# Patient Record
Sex: FEMALE | Race: OTHER | NOT HISPANIC OR LATINO | Employment: OTHER | RURAL
[De-identification: names, ages, dates, MRNs, and addresses within clinical notes are randomized per-mention and may not be internally consistent; named-entity substitution may affect disease eponyms.]

---

## 2021-04-21 RX ORDER — DOXAZOSIN 1 MG/1
1 TABLET ORAL NIGHTLY
COMMUNITY

## 2021-04-21 RX ORDER — ASPIRIN 325 MG
100 TABLET, DELAYED RELEASE (ENTERIC COATED) ORAL DAILY
COMMUNITY

## 2021-04-21 RX ORDER — CHLORTHALIDONE 50 MG/1
50 TABLET ORAL DAILY
COMMUNITY

## 2021-04-21 RX ORDER — INSULIN GLARGINE 100 [IU]/ML
30 INJECTION, SOLUTION SUBCUTANEOUS DAILY
COMMUNITY
End: 2021-04-22 | Stop reason: SDUPTHER

## 2021-04-21 RX ORDER — METOPROLOL SUCCINATE 25 MG/1
25 TABLET, EXTENDED RELEASE ORAL DAILY
COMMUNITY
End: 2022-04-07 | Stop reason: SDUPTHER

## 2021-04-21 RX ORDER — LOSARTAN POTASSIUM 100 MG/1
100 TABLET ORAL DAILY
COMMUNITY
End: 2022-11-14

## 2021-04-21 RX ORDER — GLIMEPIRIDE 4 MG/1
4 TABLET ORAL
COMMUNITY
End: 2021-04-22

## 2021-04-21 RX ORDER — DILTIAZEM HYDROCHLORIDE 240 MG/1
240 CAPSULE, COATED, EXTENDED RELEASE ORAL DAILY
COMMUNITY
End: 2024-01-12 | Stop reason: DRUGHIGH

## 2021-04-22 ENCOUNTER — OFFICE VISIT (OUTPATIENT)
Dept: DIABETES SERVICES | Facility: CLINIC | Age: 71
End: 2021-04-22
Payer: MEDICARE

## 2021-04-22 VITALS
DIASTOLIC BLOOD PRESSURE: 78 MMHG | WEIGHT: 204.63 LBS | OXYGEN SATURATION: 98 % | RESPIRATION RATE: 16 BRPM | BODY MASS INDEX: 30.31 KG/M2 | HEIGHT: 69 IN | SYSTOLIC BLOOD PRESSURE: 158 MMHG | HEART RATE: 65 BPM

## 2021-04-22 DIAGNOSIS — Z79.4 TYPE 2 DIABETES MELLITUS WITH HYPERGLYCEMIA, WITH LONG-TERM CURRENT USE OF INSULIN: Primary | ICD-10-CM

## 2021-04-22 DIAGNOSIS — E11.69 TYPE 2 DIABETES MELLITUS WITH OTHER SPECIFIED COMPLICATION, WITHOUT LONG-TERM CURRENT USE OF INSULIN: ICD-10-CM

## 2021-04-22 DIAGNOSIS — E78.5 HYPERLIPIDEMIA, UNSPECIFIED HYPERLIPIDEMIA TYPE: ICD-10-CM

## 2021-04-22 DIAGNOSIS — I10 HYPERTENSION, UNSPECIFIED TYPE: ICD-10-CM

## 2021-04-22 DIAGNOSIS — E11.65 TYPE 2 DIABETES MELLITUS WITH HYPERGLYCEMIA, WITH LONG-TERM CURRENT USE OF INSULIN: Primary | ICD-10-CM

## 2021-04-22 LAB
GLUCOSE SERPL-MCNC: 177 MG/DL (ref 70–110)
HBA1C MFR BLD: 7.5 %

## 2021-04-22 PROCEDURE — 99214 PR OFFICE/OUTPT VISIT, EST, LEVL IV, 30-39 MIN: ICD-10-PCS | Mod: S$PBB,,, | Performed by: NURSE PRACTITIONER

## 2021-04-22 PROCEDURE — 82962 GLUCOSE BLOOD TEST: CPT | Mod: PBBFAC | Performed by: NURSE PRACTITIONER

## 2021-04-22 PROCEDURE — 99999 PR PBB SHADOW E&M-EST. PATIENT-LVL V: CPT | Mod: PBBFAC,,, | Performed by: NURSE PRACTITIONER

## 2021-04-22 PROCEDURE — 99214 OFFICE O/P EST MOD 30 MIN: CPT | Mod: S$PBB,,, | Performed by: NURSE PRACTITIONER

## 2021-04-22 PROCEDURE — 99215 OFFICE O/P EST HI 40 MIN: CPT | Mod: PBBFAC | Performed by: NURSE PRACTITIONER

## 2021-04-22 PROCEDURE — 83036 HEMOGLOBIN GLYCOSYLATED A1C: CPT | Mod: PBBFAC | Performed by: NURSE PRACTITIONER

## 2021-04-22 PROCEDURE — 99999 PR PBB SHADOW E&M-EST. PATIENT-LVL V: ICD-10-PCS | Mod: PBBFAC,,, | Performed by: NURSE PRACTITIONER

## 2021-04-22 RX ORDER — INSULIN GLARGINE 100 [IU]/ML
30 INJECTION, SOLUTION SUBCUTANEOUS DAILY
Qty: 45 ML | Refills: 1 | Status: SHIPPED | OUTPATIENT
Start: 2021-04-22 | End: 2021-10-28

## 2021-04-22 RX ORDER — SULFAMETHOXAZOLE AND TRIMETHOPRIM 800; 160 MG/1; MG/1
1 TABLET ORAL 2 TIMES DAILY
COMMUNITY

## 2021-04-22 RX ORDER — DARUNAVIR ETHANOLATE AND COBICISTAT 800; 150 MG/1; MG/1
1 TABLET, FILM COATED ORAL DAILY
COMMUNITY
End: 2023-08-01 | Stop reason: SDUPTHER

## 2021-04-22 RX ORDER — GLIPIZIDE 10 MG/1
10 TABLET, FILM COATED, EXTENDED RELEASE ORAL
Qty: 90 TABLET | Refills: 1 | Status: SHIPPED | OUTPATIENT
Start: 2021-04-22 | End: 2021-10-05 | Stop reason: SDUPTHER

## 2021-07-22 ENCOUNTER — OFFICE VISIT (OUTPATIENT)
Dept: DIABETES SERVICES | Facility: CLINIC | Age: 71
End: 2021-07-22
Payer: MEDICARE

## 2021-07-22 VITALS
WEIGHT: 198 LBS | DIASTOLIC BLOOD PRESSURE: 90 MMHG | BODY MASS INDEX: 29.33 KG/M2 | SYSTOLIC BLOOD PRESSURE: 130 MMHG | HEART RATE: 82 BPM | OXYGEN SATURATION: 99 % | HEIGHT: 69 IN | RESPIRATION RATE: 16 BRPM

## 2021-07-22 DIAGNOSIS — I10 ESSENTIAL HYPERTENSION: ICD-10-CM

## 2021-07-22 DIAGNOSIS — Z21 ASYMPTOMATIC HIV INFECTION: ICD-10-CM

## 2021-07-22 DIAGNOSIS — Z79.4 TYPE 2 DIABETES MELLITUS WITHOUT COMPLICATION, WITH LONG-TERM CURRENT USE OF INSULIN: Primary | ICD-10-CM

## 2021-07-22 DIAGNOSIS — R01.1 MURMUR: ICD-10-CM

## 2021-07-22 DIAGNOSIS — E11.9 TYPE 2 DIABETES MELLITUS WITHOUT COMPLICATION, WITH LONG-TERM CURRENT USE OF INSULIN: Primary | ICD-10-CM

## 2021-07-22 DIAGNOSIS — E78.5 HYPERLIPIDEMIA, UNSPECIFIED HYPERLIPIDEMIA TYPE: ICD-10-CM

## 2021-07-22 LAB
GLUCOSE SERPL-MCNC: 148 MG/DL (ref 70–110)
HBA1C MFR BLD: 7.4 % (ref 4.5–6.6)

## 2021-07-22 PROCEDURE — 99213 OFFICE O/P EST LOW 20 MIN: CPT | Mod: S$PBB,,, | Performed by: NURSE PRACTITIONER

## 2021-07-22 PROCEDURE — 99215 OFFICE O/P EST HI 40 MIN: CPT | Mod: PBBFAC | Performed by: NURSE PRACTITIONER

## 2021-07-22 PROCEDURE — 83036 HEMOGLOBIN GLYCOSYLATED A1C: CPT | Mod: PBBFAC | Performed by: NURSE PRACTITIONER

## 2021-07-22 PROCEDURE — 82962 GLUCOSE BLOOD TEST: CPT | Mod: PBBFAC | Performed by: NURSE PRACTITIONER

## 2021-07-22 PROCEDURE — 99213 PR OFFICE/OUTPT VISIT, EST, LEVL III, 20-29 MIN: ICD-10-PCS | Mod: S$PBB,,, | Performed by: NURSE PRACTITIONER

## 2021-10-05 ENCOUNTER — OFFICE VISIT (OUTPATIENT)
Dept: CARDIOLOGY | Facility: CLINIC | Age: 71
End: 2021-10-05
Payer: MEDICARE

## 2021-10-05 VITALS
DIASTOLIC BLOOD PRESSURE: 72 MMHG | HEART RATE: 62 BPM | WEIGHT: 197.75 LBS | RESPIRATION RATE: 14 BRPM | SYSTOLIC BLOOD PRESSURE: 140 MMHG | BODY MASS INDEX: 29.29 KG/M2 | HEIGHT: 69 IN

## 2021-10-05 DIAGNOSIS — E78.5 HYPERLIPIDEMIA, UNSPECIFIED HYPERLIPIDEMIA TYPE: ICD-10-CM

## 2021-10-05 DIAGNOSIS — I48.0 PAROXYSMAL ATRIAL FIBRILLATION: Primary | ICD-10-CM

## 2021-10-05 DIAGNOSIS — I10 HYPERTENSION, UNSPECIFIED TYPE: ICD-10-CM

## 2021-10-05 DIAGNOSIS — Z79.899 ENCOUNTER FOR LONG-TERM (CURRENT) USE OF OTHER MEDICATIONS: ICD-10-CM

## 2021-10-05 DIAGNOSIS — E11.9 TYPE 2 DIABETES MELLITUS WITHOUT COMPLICATION, WITHOUT LONG-TERM CURRENT USE OF INSULIN: Primary | ICD-10-CM

## 2021-10-05 DIAGNOSIS — B20 HIV INFECTION, UNSPECIFIED SYMPTOM STATUS: ICD-10-CM

## 2021-10-05 DIAGNOSIS — I27.20 PULMONARY HYPERTENSION: ICD-10-CM

## 2021-10-05 PROCEDURE — 93010 ELECTROCARDIOGRAM REPORT: CPT | Mod: S$PBB,,, | Performed by: INTERNAL MEDICINE

## 2021-10-05 PROCEDURE — 93005 ELECTROCARDIOGRAM TRACING: CPT | Mod: PBBFAC | Performed by: INTERNAL MEDICINE

## 2021-10-05 PROCEDURE — 93010 EKG 12-LEAD: ICD-10-PCS | Mod: S$PBB,,, | Performed by: INTERNAL MEDICINE

## 2021-10-05 PROCEDURE — 99214 PR OFFICE/OUTPT VISIT, EST, LEVL IV, 30-39 MIN: ICD-10-PCS | Mod: S$PBB,,, | Performed by: INTERNAL MEDICINE

## 2021-10-05 PROCEDURE — 99214 OFFICE O/P EST MOD 30 MIN: CPT | Mod: PBBFAC | Performed by: INTERNAL MEDICINE

## 2021-10-05 PROCEDURE — 99214 OFFICE O/P EST MOD 30 MIN: CPT | Mod: S$PBB,,, | Performed by: INTERNAL MEDICINE

## 2021-10-05 RX ORDER — GLIPIZIDE 10 MG/1
10 TABLET, FILM COATED, EXTENDED RELEASE ORAL
Qty: 90 TABLET | Refills: 1 | Status: SHIPPED | OUTPATIENT
Start: 2021-10-05 | End: 2022-02-22 | Stop reason: SDUPTHER

## 2021-10-28 ENCOUNTER — OFFICE VISIT (OUTPATIENT)
Dept: DIABETES SERVICES | Facility: CLINIC | Age: 71
End: 2021-10-28
Payer: MEDICARE

## 2021-10-28 VITALS
DIASTOLIC BLOOD PRESSURE: 72 MMHG | SYSTOLIC BLOOD PRESSURE: 138 MMHG | HEART RATE: 68 BPM | RESPIRATION RATE: 16 BRPM | WEIGHT: 203 LBS | OXYGEN SATURATION: 99 % | HEIGHT: 69 IN | BODY MASS INDEX: 30.07 KG/M2

## 2021-10-28 DIAGNOSIS — E11.42 DIABETIC POLYNEUROPATHY ASSOCIATED WITH TYPE 2 DIABETES MELLITUS: ICD-10-CM

## 2021-10-28 DIAGNOSIS — I27.20 PULMONARY HYPERTENSION: ICD-10-CM

## 2021-10-28 DIAGNOSIS — B20 HIV INFECTION, UNSPECIFIED SYMPTOM STATUS: ICD-10-CM

## 2021-10-28 DIAGNOSIS — E11.9 TYPE 2 DIABETES MELLITUS WITHOUT COMPLICATION, WITH LONG-TERM CURRENT USE OF INSULIN: Primary | ICD-10-CM

## 2021-10-28 DIAGNOSIS — E78.5 HYPERLIPIDEMIA, UNSPECIFIED HYPERLIPIDEMIA TYPE: ICD-10-CM

## 2021-10-28 DIAGNOSIS — I10 PRIMARY HYPERTENSION: ICD-10-CM

## 2021-10-28 DIAGNOSIS — Z79.4 TYPE 2 DIABETES MELLITUS WITHOUT COMPLICATION, WITH LONG-TERM CURRENT USE OF INSULIN: Primary | ICD-10-CM

## 2021-10-28 LAB
GLUCOSE SERPL-MCNC: 98 MG/DL (ref 70–110)
HBA1C MFR BLD: 7.7 % (ref 4.5–6.6)

## 2021-10-28 PROCEDURE — 99215 OFFICE O/P EST HI 40 MIN: CPT | Mod: PBBFAC | Performed by: NURSE PRACTITIONER

## 2021-10-28 PROCEDURE — 99214 PR OFFICE/OUTPT VISIT, EST, LEVL IV, 30-39 MIN: ICD-10-PCS | Mod: S$PBB,,, | Performed by: NURSE PRACTITIONER

## 2021-10-28 PROCEDURE — 99214 OFFICE O/P EST MOD 30 MIN: CPT | Mod: S$PBB,,, | Performed by: NURSE PRACTITIONER

## 2021-10-28 PROCEDURE — 82962 GLUCOSE BLOOD TEST: CPT | Mod: PBBFAC | Performed by: NURSE PRACTITIONER

## 2021-10-28 PROCEDURE — 83036 HEMOGLOBIN GLYCOSYLATED A1C: CPT | Mod: PBBFAC | Performed by: NURSE PRACTITIONER

## 2021-10-28 RX ORDER — INSULIN GLARGINE 100 [IU]/ML
34 INJECTION, SOLUTION SUBCUTANEOUS DAILY
Qty: 45 ML | Refills: 1 | Status: SHIPPED | OUTPATIENT
Start: 2021-10-28 | End: 2022-08-08 | Stop reason: SDUPTHER

## 2021-11-03 ENCOUNTER — TELEPHONE (OUTPATIENT)
Dept: DIABETES SERVICES | Facility: CLINIC | Age: 71
End: 2021-11-03
Payer: MEDICARE

## 2022-02-22 DIAGNOSIS — E11.9 TYPE 2 DIABETES MELLITUS WITHOUT COMPLICATION, WITHOUT LONG-TERM CURRENT USE OF INSULIN: ICD-10-CM

## 2022-02-22 RX ORDER — GLIPIZIDE 10 MG/1
10 TABLET, FILM COATED, EXTENDED RELEASE ORAL
Qty: 90 TABLET | Refills: 1 | Status: SHIPPED | OUTPATIENT
Start: 2022-02-22 | End: 2022-09-01 | Stop reason: SDUPTHER

## 2022-02-22 RX ORDER — ISOPROPYL ALCOHOL 70 ML/100ML
SWAB TOPICAL
Qty: 100 EACH | Refills: 1 | Status: SHIPPED | OUTPATIENT
Start: 2022-02-22 | End: 2022-02-24 | Stop reason: SDUPTHER

## 2022-02-22 RX ORDER — ISOPROPYL ALCOHOL 70 ML/100ML
SWAB TOPICAL
COMMUNITY
Start: 2021-12-22 | End: 2022-02-22 | Stop reason: SDUPTHER

## 2022-02-24 RX ORDER — ISOPROPYL ALCOHOL 70 ML/100ML
SWAB TOPICAL
Qty: 400 EACH | Refills: 1 | Status: SHIPPED | OUTPATIENT
Start: 2022-02-24 | End: 2022-12-01 | Stop reason: SDUPTHER

## 2022-02-24 NOTE — TELEPHONE ENCOUNTER
----- Message from Purvi Herrera sent at 2/24/2022  2:42 PM CST -----  Regarding: Alcohol Wipes  [facility] needs a new script sent in for alcohol wipes. Should be qty 800 not 100. They need it resent in

## 2022-03-01 NOTE — TELEPHONE ENCOUNTER
----- Message from Purvi Herrera sent at 2/28/2022  4:47 PM CST -----  Patient request refill on test strips sent to [facility]

## 2022-04-07 ENCOUNTER — OFFICE VISIT (OUTPATIENT)
Dept: CARDIOLOGY | Facility: CLINIC | Age: 72
End: 2022-04-07
Payer: MEDICARE

## 2022-04-07 VITALS
SYSTOLIC BLOOD PRESSURE: 150 MMHG | BODY MASS INDEX: 28.71 KG/M2 | HEIGHT: 69 IN | OXYGEN SATURATION: 98 % | WEIGHT: 193.81 LBS | HEART RATE: 76 BPM | DIASTOLIC BLOOD PRESSURE: 90 MMHG

## 2022-04-07 DIAGNOSIS — E78.5 HYPERLIPIDEMIA, UNSPECIFIED HYPERLIPIDEMIA TYPE: Chronic | ICD-10-CM

## 2022-04-07 DIAGNOSIS — I48.0 PAROXYSMAL ATRIAL FIBRILLATION: Primary | Chronic | ICD-10-CM

## 2022-04-07 DIAGNOSIS — I10 PRIMARY HYPERTENSION: Chronic | ICD-10-CM

## 2022-04-07 DIAGNOSIS — I48.92 ATRIAL FLUTTER, UNSPECIFIED TYPE: Chronic | ICD-10-CM

## 2022-04-07 DIAGNOSIS — B20 HIV INFECTION, UNSPECIFIED SYMPTOM STATUS: Chronic | ICD-10-CM

## 2022-04-07 DIAGNOSIS — N18.9 CHRONIC KIDNEY DISEASE, UNSPECIFIED CKD STAGE: Chronic | ICD-10-CM

## 2022-04-07 PROCEDURE — 93010 ELECTROCARDIOGRAM REPORT: CPT | Mod: S$PBB,,, | Performed by: INTERNAL MEDICINE

## 2022-04-07 PROCEDURE — 93005 ELECTROCARDIOGRAM TRACING: CPT | Mod: PBBFAC | Performed by: INTERNAL MEDICINE

## 2022-04-07 PROCEDURE — 93010 EKG 12-LEAD: ICD-10-PCS | Mod: S$PBB,,, | Performed by: INTERNAL MEDICINE

## 2022-04-07 PROCEDURE — 99214 OFFICE O/P EST MOD 30 MIN: CPT | Mod: PBBFAC | Performed by: INTERNAL MEDICINE

## 2022-04-07 PROCEDURE — 99214 PR OFFICE/OUTPT VISIT, EST, LEVL IV, 30-39 MIN: ICD-10-PCS | Mod: S$PBB,,, | Performed by: INTERNAL MEDICINE

## 2022-04-07 PROCEDURE — 99214 OFFICE O/P EST MOD 30 MIN: CPT | Mod: S$PBB,,, | Performed by: INTERNAL MEDICINE

## 2022-04-07 RX ORDER — METOPROLOL SUCCINATE 25 MG/1
25 TABLET, EXTENDED RELEASE ORAL DAILY
Qty: 90 TABLET | Refills: 1 | Status: SHIPPED | OUTPATIENT
Start: 2022-04-07

## 2022-04-07 RX ORDER — ISOSORBIDE MONONITRATE 30 MG/1
30 TABLET, EXTENDED RELEASE ORAL DAILY
Qty: 90 TABLET | Refills: 3 | Status: SHIPPED | OUTPATIENT
Start: 2022-04-07 | End: 2023-04-06

## 2022-04-07 RX ORDER — HYDRALAZINE HYDROCHLORIDE 25 MG/1
37.5 TABLET, FILM COATED ORAL 3 TIMES DAILY
Qty: 405 TABLET | Refills: 3 | Status: SHIPPED | OUTPATIENT
Start: 2022-04-07 | End: 2022-11-14

## 2022-04-07 RX ORDER — OLMESARTAN MEDOXOMIL 40 MG/1
40 TABLET ORAL DAILY
COMMUNITY
Start: 2022-03-30 | End: 2023-11-30

## 2022-04-14 PROBLEM — I48.92 ATRIAL FLUTTER: Chronic | Status: ACTIVE | Noted: 2022-04-14

## 2022-04-14 PROBLEM — N18.9 CHRONIC KIDNEY DISEASE: Chronic | Status: ACTIVE | Noted: 2022-04-14

## 2022-04-14 NOTE — PROGRESS NOTES
[facility] note        DATE OF SERVICE: 04/14/2022       PCP: Primary Doctor No      CHIEF COMPLAINT:   Chief Complaint   Patient presents with    Follow-up     Patient denies any cardiac complaints today.        HISTORY OF PRESENT ILLNESS:  Hallie Pena is a 72 y.o. female with a PMH of   Past Medical History:   Diagnosis Date    Atrial fibrillation     Atrial flutter     CKD (chronic kidney disease)     Corns and callosities     Diabetic neuropathy     HIV (human immunodeficiency virus infection)     Hyperlipemia     Hypertension     Murmur     Pulmonary hypertension     Renal impairment     Type 2 diabetes mellitus      who presents for   Chief Complaint   Patient presents with    Follow-up     Patient denies any cardiac complaints today.          Review of Systems: Review of Systems   Respiratory: Negative for shortness of breath.    Cardiovascular: Negative for chest pain, palpitations and leg swelling.   Neurological: Negative for loss of consciousness.        PAST MEDICAL HISTORY:  Past Medical History:   Diagnosis Date    Atrial fibrillation     Atrial flutter     CKD (chronic kidney disease)     Corns and callosities     Diabetic neuropathy     HIV (human immunodeficiency virus infection)     Hyperlipemia     Hypertension     Murmur     Pulmonary hypertension     Renal impairment     Type 2 diabetes mellitus        PAST SURGICAL HISTORY:  Past Surgical History:   Procedure Laterality Date    ADENOIDECTOMY      HYSTERECTOMY      TONSILLECTOMY         SOCIAL HISTORY:  Social History     Socioeconomic History    Marital status: Single   Tobacco Use    Smoking status: Former Smoker    Smokeless tobacco: Former User   Substance and Sexual Activity    Alcohol use: Not Currently    Drug use: Never    Sexual activity: Not Currently       FAMILY HISTORY:  Family History   Problem Relation Age of Onset    Diabetes Father     Kidney disease Father     Multiple  myeloma Mother     Breast cancer Mother     Stroke Mother     No Known Problems Sister     No Known Problems Brother     Diabetes Son     Hypertension Son     Drug abuse Son          ALLERGIES:  Review of patient's allergies indicates:   Allergen Reactions    Aspirin     Metformin         MEDICATIONS:    Current Outpatient Medications:     alcohol swabs PadM, USE WITH GLUCOSE CHECKS AND WHEN GIVING INSULIN 4 TIMES DAILY, Disp: 400 each, Rfl: 1    apixaban (ELIQUIS) 2.5 mg Tab, Take 1 tablet by mouth 2 (two) times daily., Disp: , Rfl:     blood sugar diagnostic (ACCU-CHEK GUIDE TEST STRIPS) Strp, USE STRIPS TO TEST BLOOD SUGAR 4 TIMES DAILY, Disp: 400 each, Rfl: 3    chlorthalidone (HYGROTEN) 50 MG Tab, Take 50 mg by mouth once daily., Disp: , Rfl:     darunavir-cobicistat (PREZCOBIX) 800-150 mg-mg Tab tablet, Take 1 tablet by mouth once daily., Disp: , Rfl:     darunavir-cobicistat (PREZCOBIX) 800-150 mg-mg Tab tablet, Take 1 tablet by mouth once daily., Disp: , Rfl:     diltiaZEM (CARDIZEM CD) 240 MG 24 hr capsule, Take 240 mg by mouth once daily., Disp: , Rfl:     dolutegravir (TIVICAY) 50 mg Tab, Take 50 mg by mouth once daily., Disp: , Rfl:     doxazosin (CARDURA) 1 MG tablet, Take 1 mg by mouth every evening., Disp: , Rfl:     glipiZIDE (GLUCOTROL) 10 MG TR24, Take 1 tablet (10 mg total) by mouth daily with breakfast., Disp: 90 tablet, Rfl: 1    insulin glargine 100 units/mL (3mL) SubQ pen, Inject 34 Units into the skin once daily., Disp: 45 mL, Rfl: 1    olmesartan (BENICAR) 40 MG tablet, Take 40 mg by mouth once daily., Disp: , Rfl:     co-enzyme Q-10 50 mg capsule, Take 100 mg by mouth once daily., Disp: , Rfl:     hydrALAZINE (APRESOLINE) 25 MG tablet, Take 1.5 tablets (37.5 mg total) by mouth 3 (three) times daily., Disp: 405 tablet, Rfl: 3    isosorbide mononitrate (IMDUR) 30 MG 24 hr tablet, Take 1 tablet (30 mg total) by mouth once daily., Disp: 90 tablet, Rfl: 3    losartan  "(COZAAR) 100 MG tablet, Take 100 mg by mouth once daily., Disp: , Rfl:     metoprolol succinate (TOPROL-XL) 25 MG 24 hr tablet, Take 1 tablet (25 mg total) by mouth once daily., Disp: 90 tablet, Rfl: 1    sulfamethoxazole-trimethoprim 800-160mg (BACTRIM DS) 800-160 mg Tab, Take 1 tablet by mouth 2 (two) times daily. prn, Disp: , Rfl:      PHYSICAL EXAM:  BP (!) 150/90   Pulse 76   Ht 5' 9" (1.753 m)   Wt 87.9 kg (193 lb 12.8 oz)   SpO2 98%   BMI 28.62 kg/m²   Wt Readings from Last 3 Encounters:   04/07/22 87.9 kg (193 lb 12.8 oz)   10/28/21 92.1 kg (203 lb)   10/05/21 89.7 kg (197 lb 12 oz)      Body mass index is 28.62 kg/m².    Physical Exam  Vitals reviewed.   Constitutional:       Appearance: Normal appearance.   HENT:      Head: Normocephalic and atraumatic.   Neck:      Vascular: No carotid bruit or JVD.   Cardiovascular:      Rate and Rhythm: Normal rate and regular rhythm.      Pulses: Normal pulses.           Radial pulses are 2+ on the right side and 2+ on the left side.        Dorsalis pedis pulses are 2+ on the right side and 2+ on the left side.      Heart sounds: Normal heart sounds.   Pulmonary:      Effort: Pulmonary effort is normal.      Breath sounds: Normal breath sounds.   Musculoskeletal:      Right lower leg: No edema.      Left lower leg: No edema.   Skin:     General: Skin is warm and dry.   Neurological:      Mental Status: She is alert.         LABS REVIEWED:  No results found for: WBC, RBC, HGB, HCT, MCV, MCH, MCHC, RDW, PLT, MPV, NRBC, INR  Lab Results   Component Value Date     10/28/2021    K 4.4 10/28/2021     10/28/2021    CO2 29 10/28/2021    BUN 32 (H) 10/28/2021     Lab Results   Component Value Date    AST 23 10/28/2021    ALT 32 10/28/2021     Lab Results   Component Value Date     10/28/2021    HGBA1C 7.7 (A) 10/28/2021     Lab Results   Component Value Date    CHOL 171 10/21/2021    HDL 41 10/21/2021    TRIG 186 (H) 10/21/2021    CHOLHDL 4.2 " 10/21/2021       CARDIAC STUDIES REVIEWED:EKG: a-flutter with variable AV block, right superior axis deviation, 72 bpm        ASSESSMENT:   Active Problem List with Overview Notes    Diagnosis Date Noted    Chronic kidney disease 04/14/2022    Atrial flutter 04/14/2022    Diabetic neuropathy     Pulmonary hypertension 10/05/2021    Encounter for long-term (current) use of other medications 10/05/2021    Paroxysmal atrial fibrillation 10/05/2021    Murmur     HIV (human immunodeficiency virus infection)     Hyperlipemia     Hypertension     Type 2 diabetes mellitus      VISIT DIAGNOSIS:  Paroxysmal atrial fibrillation  -     EKG 12-lead; Future    Atrial flutter, unspecified type    Hyperlipidemia, unspecified hyperlipidemia type    Primary hypertension    HIV infection, unspecified symptom status    Chronic kidney disease, unspecified CKD stage    Other orders  -     isosorbide mononitrate (IMDUR) 30 MG 24 hr tablet; Take 1 tablet (30 mg total) by mouth once daily.  Dispense: 90 tablet; Refill: 3  -     hydrALAZINE (APRESOLINE) 25 MG tablet; Take 1.5 tablets (37.5 mg total) by mouth 3 (three) times daily.  Dispense: 405 tablet; Refill: 3  -     metoprolol succinate (TOPROL-XL) 25 MG 24 hr tablet; Take 1 tablet (25 mg total) by mouth once daily.  Dispense: 90 tablet; Refill: 1         PLAN:  1. Refill Metoprolol  2. Recheck b/p  3. Low Na diet  4. Bidil- generic equivalent    Orders Placed This Encounter   Procedures    EKG 12-lead     Standing Status:   Future     Number of Occurrences:   1     Standing Expiration Date:   4/7/2023      RTC 6 months.

## 2022-06-29 ENCOUNTER — OFFICE VISIT (OUTPATIENT)
Dept: DIABETES SERVICES | Facility: CLINIC | Age: 72
End: 2022-06-29
Payer: MEDICARE

## 2022-06-29 VITALS
SYSTOLIC BLOOD PRESSURE: 120 MMHG | RESPIRATION RATE: 14 BRPM | HEIGHT: 67 IN | WEIGHT: 194 LBS | DIASTOLIC BLOOD PRESSURE: 60 MMHG | HEART RATE: 62 BPM | BODY MASS INDEX: 30.45 KG/M2 | OXYGEN SATURATION: 99 %

## 2022-06-29 DIAGNOSIS — E11.42 DIABETIC POLYNEUROPATHY ASSOCIATED WITH TYPE 2 DIABETES MELLITUS: ICD-10-CM

## 2022-06-29 DIAGNOSIS — E78.5 HYPERLIPIDEMIA, UNSPECIFIED HYPERLIPIDEMIA TYPE: ICD-10-CM

## 2022-06-29 DIAGNOSIS — Z79.4 TYPE 2 DIABETES MELLITUS WITHOUT COMPLICATION, WITH LONG-TERM CURRENT USE OF INSULIN: Primary | ICD-10-CM

## 2022-06-29 DIAGNOSIS — B20 HIV INFECTION, UNSPECIFIED SYMPTOM STATUS: ICD-10-CM

## 2022-06-29 DIAGNOSIS — I27.20 PULMONARY HYPERTENSION: ICD-10-CM

## 2022-06-29 DIAGNOSIS — E11.9 TYPE 2 DIABETES MELLITUS WITHOUT COMPLICATION, WITH LONG-TERM CURRENT USE OF INSULIN: Primary | ICD-10-CM

## 2022-06-29 DIAGNOSIS — Z79.899 ENCOUNTER FOR LONG-TERM (CURRENT) USE OF OTHER MEDICATIONS: ICD-10-CM

## 2022-06-29 PROBLEM — N18.9 CKD (CHRONIC KIDNEY DISEASE): Status: ACTIVE | Noted: 2022-04-14

## 2022-06-29 LAB
GLUCOSE SERPL-MCNC: 145 MG/DL (ref 70–110)
HBA1C MFR BLD: 6.6 % (ref 4.5–6.6)

## 2022-06-29 PROCEDURE — 99214 OFFICE O/P EST MOD 30 MIN: CPT | Mod: S$PBB,,, | Performed by: NURSE PRACTITIONER

## 2022-06-29 PROCEDURE — 82962 GLUCOSE BLOOD TEST: CPT | Mod: PBBFAC | Performed by: NURSE PRACTITIONER

## 2022-06-29 PROCEDURE — 83036 HEMOGLOBIN GLYCOSYLATED A1C: CPT | Mod: PBBFAC | Performed by: NURSE PRACTITIONER

## 2022-06-29 PROCEDURE — 99214 PR OFFICE/OUTPT VISIT, EST, LEVL IV, 30-39 MIN: ICD-10-PCS | Mod: S$PBB,,, | Performed by: NURSE PRACTITIONER

## 2022-06-29 PROCEDURE — 99215 OFFICE O/P EST HI 40 MIN: CPT | Mod: PBBFAC | Performed by: NURSE PRACTITIONER

## 2022-06-29 NOTE — PROGRESS NOTES
"Subjective:       Patient ID: Hallie Pena is a 72 y.o. female.    Chief Complaint: Diabetes Mellitus (8 month follow up )    Here today for routine evaluation and med refill.  Has lost 9 pounds but admits that she has been trying to lose some weight.  HIV is managed by provider in [place] and she states that her "numbers are good"  Next appt in December.    Lab Results       Component                Value               Date                       HGBA1C                   6.6                 06/29/2022            Lab Results       Component                Value               Date                       MICROALBUR               15.1 (H)            10/28/2021            Lab Results       Component                Value               Date                       CHOL                     171                 10/21/2021            Lab Results       Component                Value               Date                       HDL                      41                  10/21/2021            Lab Results       Component                Value               Date                       LDLCALC                  93                  10/21/2021            Lab Results       Component                Value               Date                       TRIG                     186 (H)             10/21/2021            Lab Results       Component                Value               Date                       CHOLHDL                  4.2                 10/21/2021            CMP  Sodium       Date                     Value               Ref Range           Status                10/28/2021               138                 136 - 145 mmol*     Final            ----------  Potassium       Date                     Value               Ref Range           Status                10/28/2021               4.4                 3.5 - 5.1 mmol*     Final            ----------  Chloride       Date                     Value               Ref Range           Status     "            10/28/2021               107                 98 - 107 mmol/L     Final            ----------  CO2       Date                     Value               Ref Range           Status                10/28/2021               29                  21 - 32 mmol/L      Final            ----------  Glucose       Date                     Value               Ref Range           Status                10/28/2021               100                 74 - 106 mg/dL      Final            ----------  BUN       Date                     Value               Ref Range           Status                10/28/2021               32 (H)              7 - 18 mg/dL        Final            ----------  Creatinine       Date                     Value               Ref Range           Status                10/28/2021               1.89 (H)            0.55 - 1.02 mg*     Final            ----------  Calcium       Date                     Value               Ref Range           Status                10/28/2021               9.4                 8.5 - 10.1 mg/*     Final            ----------  Total Protein       Date                     Value               Ref Range           Status                10/28/2021               8.2                 6.4 - 8.2 g/dL      Final            ----------  Albumin       Date                     Value               Ref Range           Status                10/28/2021               3.8                 3.5 - 5.0 g/dL      Final            ----------  Bilirubin, Total       Date                     Value               Ref Range           Status                10/28/2021               0.6                 0.0 - 1.2 mg/dL     Final            ----------  Alk Phos       Date                     Value               Ref Range           Status                10/28/2021               71                  55 - 142 U/L        Final            ----------  AST       Date                     Value               Ref Range            Status                10/28/2021               23                  15 - 37 U/L         Final            ----------  ALT       Date                     Value               Ref Range           Status                10/28/2021               32                  13 - 56 U/L         Final            ----------  Anion Gap       Date                     Value               Ref Range           Status                10/28/2021               6 (L)               7 - 16 mmol/L       Final            ----------  eGFR        Date                     Value               Ref Range           Status                10/28/2021               34 (L)              >=60 mL/min/1.*     Final            ----------      Review of Systems   Constitutional: Negative for activity change, appetite change, diaphoresis and fatigue.   HENT: Negative for nasal congestion, facial swelling and sinus pressure/congestion.    Eyes: Negative for visual disturbance.   Respiratory: Negative for shortness of breath and wheezing.    Cardiovascular: Negative for chest pain and leg swelling.   Gastrointestinal: Negative for constipation, diarrhea, nausea and vomiting.   Endocrine: Negative for polydipsia, polyphagia and polyuria.   Genitourinary: Negative for dysuria, frequency and urgency.   Musculoskeletal: Negative for gait problem and myalgias.   Integumentary:  Negative for color change, rash and wound.   Neurological: Negative for dizziness, syncope, weakness, headaches, disturbances in coordination and coordination difficulties.   Hematological: Does not bruise/bleed easily.   Psychiatric/Behavioral: Negative for self-injury, sleep disturbance and suicidal ideas. The patient is not nervous/anxious.          Objective:      Physical Exam  Vitals and nursing note reviewed.   Constitutional:       Appearance: Normal appearance.   HENT:      Head: Normocephalic.   Cardiovascular:      Rate and Rhythm: Normal rate.      Pulses:           Dorsalis  pedis pulses are 3+ on the right side and 3+ on the left side.        Posterior tibial pulses are 3+ on the right side and 3+ on the left side.   Pulmonary:      Effort: Pulmonary effort is normal.   Musculoskeletal:         General: Normal range of motion.      Right foot: Normal range of motion. No deformity.      Left foot: Normal range of motion. No deformity.   Feet:      Right foot:      Protective Sensation: 5 sites tested. 3 sites sensed.      Skin integrity: Callus present. No ulcer.      Toenail Condition: Right toenails are abnormally thick. Fungal disease present.     Left foot:      Protective Sensation: 5 sites tested. 3 sites sensed.      Skin integrity: Callus present. No ulcer.      Toenail Condition: Left toenails are abnormally thick. Fungal disease present.  Skin:     General: Skin is warm and dry.   Neurological:      General: No focal deficit present.      Mental Status: She is alert and oriented to person, place, and time.   Psychiatric:         Mood and Affect: Mood normal.         Behavior: Behavior normal.         Thought Content: Thought content normal.         Judgment: Judgment normal.         Assessment:       Problem List Items Addressed This Visit        Neuro    Diabetic neuropathy       Pulmonary    Pulmonary hypertension       Cardiac/Vascular    Hyperlipemia       ID    HIV (human immunodeficiency virus infection)       Endocrine    Type 2 diabetes mellitus - Primary    Relevant Orders    POCT Glucose, Hand-Held Device (Completed)    Hemoglobin A1C, POCT (Completed)       Other    Encounter for long-term (current) use of other medications          Plan:       Problem List Items Addressed This Visit        Neuro    Diabetic neuropathy       Pulmonary    Pulmonary hypertension       Cardiac/Vascular    Hyperlipemia       ID    HIV (human immunodeficiency virus infection)       Endocrine    Type 2 diabetes mellitus - Primary    Relevant Orders    POCT Glucose, Hand-Held Device  (Completed)    Hemoglobin A1C, POCT (Completed)       Other    Encounter for long-term (current) use of other medications

## 2022-06-29 NOTE — PATIENT INSTRUCTIONS
Pt is advised to monitor and document glucose fasting when you wake up before you eat and 2 hours after meal and bring in meter to next visit.      Ensure to take medications as directed.      Follow diabetic diet as directed.      Work to achieve normal body weight.     Ensure to exercise 4-5 times per week for 20 minutes.  Snacks with 0-5 grams carbs   Hard Boiled Egg   Crystal Light, Vitamin Water, Powerade Zero   Herbal tea, unsweetened   8 oz unsweetened almond milk   2 tsp peanut butter on celery   ½ cup sugar-free Jell-O   1 sugar-free popsicle   Non starchy vegetables such as carrots or celery sticks with lowfat dressing   ½ oz lowfat cheese or string cheese   1 closed handful of nuts or tbsp of seeds, unsalted    Snacks with 15 gram carbs  . 1 small piece of fruit or . banana or . cup light canned fruit  . 3 kaylene cracker squares  . 3 cups popcorn  . 5 Vanilla Wafers  . 1/2 cup low fat, no added sugar ice cream or frozen yogurt  . 1/2 turkey, ham, or chicken sandwich  . 1.2 cup fruit with 1/2 cup of cottage cheese  . 4-6 unsalted wheat crackers with 1 oz low fat cheese or 1 tbsp peanut butter  . 30 goldfish crackers  . 7-8 mini rice cakes  . 1/3 cup hummus dip with raw vegetables  . 1/2 whole wheat tonny, 1 tbsp hummus  . Mini pizza (. whole wheat English muffin, low-fat cheese, tomato sauce)  . 100 calorie snack pack  . 4-6 oz light yogurt  . 1/2 cup sugar-free pudding

## 2022-06-30 DIAGNOSIS — N18.4 STAGE 4 CHRONIC KIDNEY DISEASE: Primary | ICD-10-CM

## 2022-07-22 RX ORDER — PEN NEEDLE, DIABETIC 30 GX3/16"
NEEDLE, DISPOSABLE MISCELLANEOUS
COMMUNITY
End: 2022-07-22 | Stop reason: SDUPTHER

## 2022-07-25 RX ORDER — PEN NEEDLE, DIABETIC 30 GX3/16"
NEEDLE, DISPOSABLE MISCELLANEOUS
Qty: 100 EACH | Refills: 3 | Status: SHIPPED | OUTPATIENT
Start: 2022-07-25 | End: 2023-11-30 | Stop reason: SDUPTHER

## 2022-08-08 RX ORDER — INSULIN GLARGINE 100 [IU]/ML
34 INJECTION, SOLUTION SUBCUTANEOUS DAILY
Qty: 45 ML | Refills: 1 | Status: SHIPPED | OUTPATIENT
Start: 2022-08-08 | End: 2023-04-13 | Stop reason: SDUPTHER

## 2022-08-08 NOTE — TELEPHONE ENCOUNTER
----- Message from Purvi Herrera sent at 8/8/2022 12:27 PM CDT -----  Request for Lantus sent to [facility]

## 2022-09-01 DIAGNOSIS — E11.9 TYPE 2 DIABETES MELLITUS WITHOUT COMPLICATION, WITHOUT LONG-TERM CURRENT USE OF INSULIN: ICD-10-CM

## 2022-09-01 RX ORDER — GLIPIZIDE 10 MG/1
10 TABLET, FILM COATED, EXTENDED RELEASE ORAL
Qty: 90 TABLET | Refills: 1 | Status: SHIPPED | OUTPATIENT
Start: 2022-09-01 | End: 2023-02-23 | Stop reason: SDUPTHER

## 2022-09-01 NOTE — TELEPHONE ENCOUNTER
----- Message from Purvi Herrera sent at 9/1/2022  4:41 PM CDT -----  Patient request refill of glipizide sent to [facility]

## 2022-11-09 ENCOUNTER — OFFICE VISIT (OUTPATIENT)
Dept: CARDIOLOGY | Facility: CLINIC | Age: 72
End: 2022-11-09
Payer: MEDICARE

## 2022-11-09 VITALS
HEART RATE: 72 BPM | HEIGHT: 69 IN | RESPIRATION RATE: 12 BRPM | DIASTOLIC BLOOD PRESSURE: 68 MMHG | SYSTOLIC BLOOD PRESSURE: 144 MMHG | BODY MASS INDEX: 28.88 KG/M2 | WEIGHT: 195 LBS

## 2022-11-09 DIAGNOSIS — B20 HIV INFECTION, UNSPECIFIED SYMPTOM STATUS: Chronic | ICD-10-CM

## 2022-11-09 DIAGNOSIS — I48.0 PAROXYSMAL ATRIAL FIBRILLATION: Chronic | ICD-10-CM

## 2022-11-09 DIAGNOSIS — I48.92 ATRIAL FLUTTER, UNSPECIFIED TYPE: Primary | Chronic | ICD-10-CM

## 2022-11-09 DIAGNOSIS — E78.5 HYPERLIPIDEMIA, UNSPECIFIED HYPERLIPIDEMIA TYPE: Chronic | ICD-10-CM

## 2022-11-09 DIAGNOSIS — E11.69 TYPE 2 DIABETES MELLITUS WITH OTHER SPECIFIED COMPLICATION, WITHOUT LONG-TERM CURRENT USE OF INSULIN: Chronic | ICD-10-CM

## 2022-11-09 DIAGNOSIS — I10 PRIMARY HYPERTENSION: Chronic | ICD-10-CM

## 2022-11-09 PROCEDURE — 93010 ELECTROCARDIOGRAM REPORT: CPT | Mod: S$PBB,,, | Performed by: INTERNAL MEDICINE

## 2022-11-09 PROCEDURE — 99213 PR OFFICE/OUTPT VISIT, EST, LEVL III, 20-29 MIN: ICD-10-PCS | Mod: S$PBB,,, | Performed by: INTERNAL MEDICINE

## 2022-11-09 PROCEDURE — 99214 OFFICE O/P EST MOD 30 MIN: CPT | Mod: PBBFAC | Performed by: INTERNAL MEDICINE

## 2022-11-09 PROCEDURE — 93010 EKG 12-LEAD: ICD-10-PCS | Mod: S$PBB,,, | Performed by: INTERNAL MEDICINE

## 2022-11-09 PROCEDURE — 93005 ELECTROCARDIOGRAM TRACING: CPT | Mod: PBBFAC | Performed by: INTERNAL MEDICINE

## 2022-11-09 PROCEDURE — 99213 OFFICE O/P EST LOW 20 MIN: CPT | Mod: S$PBB,,, | Performed by: INTERNAL MEDICINE

## 2022-11-11 NOTE — PROGRESS NOTES
[facility] note        DATE OF SERVICE: 11/14/2022       PCP: Primary Doctor No      CHIEF COMPLAINT:   Chief Complaint   Patient presents with    Follow-up     6 month f/u.     Chest Pain     C/o chest discomfort left chest.         HISTORY OF PRESENT ILLNESS:  Hallie Pena is a 72 y.o. female with a PMH of   Past Medical History:   Diagnosis Date    Atrial fibrillation     Atrial flutter     CKD (chronic kidney disease)     Corns and callosities     Diabetic neuropathy     HIV (human immunodeficiency virus infection)     Hyperlipemia     Hypertension     Murmur     Pulmonary hypertension     Renal impairment     Type 2 diabetes mellitus      who presents for   Chief Complaint   Patient presents with    Follow-up     6 month f/u.     Chest Pain     C/o chest discomfort left chest.           Review of Systems: Review of Systems   Respiratory:  Negative for shortness of breath.    Cardiovascular:  Negative for palpitations and leg swelling.   Neurological:  Negative for loss of consciousness.      PAST MEDICAL HISTORY:  Past Medical History:   Diagnosis Date    Atrial fibrillation     Atrial flutter     CKD (chronic kidney disease)     Corns and callosities     Diabetic neuropathy     HIV (human immunodeficiency virus infection)     Hyperlipemia     Hypertension     Murmur     Pulmonary hypertension     Renal impairment     Type 2 diabetes mellitus        PAST SURGICAL HISTORY:  Past Surgical History:   Procedure Laterality Date    ADENOIDECTOMY      HYSTERECTOMY      TONSILLECTOMY         SOCIAL HISTORY:  Social History     Socioeconomic History    Marital status: Single   Tobacco Use    Smoking status: Former    Smokeless tobacco: Former   Substance and Sexual Activity    Alcohol use: Not Currently    Drug use: Never    Sexual activity: Not Currently       FAMILY HISTORY:  Family History   Problem Relation Age of Onset    Diabetes Father     Kidney disease Father     Multiple myeloma Mother      Breast cancer Mother     Stroke Mother     No Known Problems Sister     No Known Problems Brother     Diabetes Son     Hypertension Son     Drug abuse Son          ALLERGIES:  Review of patient's allergies indicates:   Allergen Reactions    Aspirin     Metformin         MEDICATIONS:    Current Outpatient Medications:     apixaban (ELIQUIS) 2.5 mg Tab, Take 1 tablet by mouth 2 (two) times daily., Disp: , Rfl:     chlorthalidone (HYGROTEN) 50 MG Tab, Take 50 mg by mouth once daily., Disp: , Rfl:     co-enzyme Q-10 50 mg capsule, Take 100 mg by mouth once daily., Disp: , Rfl:     darunavir-cobicistat (PREZCOBIX) 800-150 mg-mg Tab tablet, Take 1 tablet by mouth once daily., Disp: , Rfl:     diltiaZEM (CARDIZEM CD) 240 MG 24 hr capsule, Take 240 mg by mouth once daily., Disp: , Rfl:     dolutegravir (TIVICAY) 50 mg Tab, Take 50 mg by mouth once daily., Disp: , Rfl:     doxazosin (CARDURA) 1 MG tablet, Take 1 mg by mouth every evening., Disp: , Rfl:     glipiZIDE (GLUCOTROL) 10 MG TR24, Take 1 tablet (10 mg total) by mouth daily with breakfast., Disp: 90 tablet, Rfl: 1    insulin glargine 100 units/mL SubQ pen, Inject 34 Units into the skin once daily., Disp: 45 mL, Rfl: 1    isosorbide mononitrate (IMDUR) 30 MG 24 hr tablet, Take 1 tablet (30 mg total) by mouth once daily., Disp: 90 tablet, Rfl: 3    metoprolol succinate (TOPROL-XL) 25 MG 24 hr tablet, Take 1 tablet (25 mg total) by mouth once daily. (Patient taking differently: Take 12.5 mg by mouth once daily.), Disp: 90 tablet, Rfl: 1    olmesartan (BENICAR) 40 MG tablet, Take 40 mg by mouth once daily., Disp: , Rfl:     sulfamethoxazole-trimethoprim 800-160mg (BACTRIM DS) 800-160 mg Tab, Take 1 tablet by mouth 2 (two) times daily. prn, Disp: , Rfl:     alcohol swabs PadM, USE WITH GLUCOSE CHECKS AND WHEN GIVING INSULIN 4 TIMES DAILY, Disp: 400 each, Rfl: 1    blood sugar diagnostic (ACCU-CHEK GUIDE TEST STRIPS) Strp, USE STRIPS TO TEST BLOOD SUGAR 4 TIMES DAILY,  "Disp: 400 each, Rfl: 3    darunavir-cobicistat (PREZCOBIX) 800-150 mg-mg Tab tablet, Take 1 tablet by mouth once daily., Disp: , Rfl:     pen needle, diabetic 32 gauge x 5/32" Ndle, Use to inject insulin sq daily, Disp: 100 each, Rfl: 3     PHYSICAL EXAM:  BP (!) 144/68 (BP Location: Left arm, Patient Position: Sitting)   Pulse 72   Resp 12   Ht 5' 9" (1.753 m)   Wt 88.5 kg (195 lb)   BMI 28.80 kg/m²   Wt Readings from Last 3 Encounters:   11/09/22 88.5 kg (195 lb)   06/29/22 88 kg (194 lb)   04/07/22 87.9 kg (193 lb 12.8 oz)      Body mass index is 28.8 kg/m².    Physical Exam  Vitals reviewed.   Constitutional:       Appearance: Normal appearance.   HENT:      Head: Normocephalic and atraumatic.   Neck:      Vascular: No carotid bruit or JVD.   Cardiovascular:      Rate and Rhythm: Normal rate and regular rhythm.      Pulses: Normal pulses.           Radial pulses are 2+ on the right side and 2+ on the left side.        Dorsalis pedis pulses are 2+ on the right side and 2+ on the left side.      Heart sounds: Normal heart sounds.   Pulmonary:      Effort: Pulmonary effort is normal.      Breath sounds: Normal breath sounds.   Musculoskeletal:      Right lower leg: No edema.      Left lower leg: No edema.   Skin:     General: Skin is warm and dry.   Neurological:      Mental Status: She is alert.     LABS REVIEWED:  No results found for: WBC, RBC, HGB, HCT, MCV, MCH, MCHC, RDW, PLT, MPV, NRBC, INR  Lab Results   Component Value Date     06/29/2022    K 5.2 (H) 06/29/2022     06/29/2022    CO2 28 06/29/2022    BUN 44 (H) 06/29/2022     Lab Results   Component Value Date    AST 22 06/29/2022    ALT 23 06/29/2022     Lab Results   Component Value Date     (H) 06/29/2022    HGBA1C 6.6 06/29/2022     Lab Results   Component Value Date    CHOL 171 10/21/2021    HDL 41 10/21/2021    TRIG 186 (H) 10/21/2021    CHOLHDL 4.2 10/21/2021       CARDIAC STUDIES REVIEWED:EKG: sinus rhythm with 1st degree AV " block, left axis deviation, minimal voltage criteria for LVH, 63 bpm.         ASSESSMENT:   Active Problem List with Overview Notes    Diagnosis Date Noted    CKD (chronic kidney disease) 04/14/2022    Atrial flutter 04/14/2022    Diabetic neuropathy     Pulmonary hypertension 10/05/2021    Encounter for long-term (current) use of other medications 10/05/2021    Paroxysmal atrial fibrillation 10/05/2021    Murmur     HIV (human immunodeficiency virus infection)     Hyperlipemia     Hypertension     Type 2 diabetes mellitus      VISIT DIAGNOSIS:  Atrial flutter, unspecified type  Comments:  on Eliquis  Orders:  -     EKG 12-lead; Future    Paroxysmal atrial fibrillation    Hyperlipidemia, unspecified hyperlipidemia type    Primary hypertension    HIV infection, unspecified symptom status    Type 2 diabetes mellitus with other specified complication, without long-term current use of insulin       PLAN:  Keto diet for wt loss  New mattress/ pillow  Increase po water intake    Orders Placed This Encounter   Procedures    EKG 12-lead     Standing Status:   Future     Number of Occurrences:   1     Standing Expiration Date:   11/9/2023      RTC 6 months.

## 2022-12-01 ENCOUNTER — TELEPHONE (OUTPATIENT)
Dept: DIABETES SERVICES | Facility: CLINIC | Age: 72
End: 2022-12-01
Payer: MEDICARE

## 2022-12-01 RX ORDER — ISOPROPYL ALCOHOL 70 ML/100ML
SWAB TOPICAL
Qty: 200 EACH | Refills: 3 | Status: SHIPPED | OUTPATIENT
Start: 2022-12-01

## 2022-12-01 NOTE — TELEPHONE ENCOUNTER
----- Message from Purvi Herrera sent at 12/1/2022  2:07 PM CST -----  Patient request alcohol swabs sent to [facility]

## 2023-02-23 DIAGNOSIS — E11.9 TYPE 2 DIABETES MELLITUS WITHOUT COMPLICATION, WITHOUT LONG-TERM CURRENT USE OF INSULIN: ICD-10-CM

## 2023-02-23 RX ORDER — GLIPIZIDE 10 MG/1
10 TABLET, FILM COATED, EXTENDED RELEASE ORAL
Qty: 90 TABLET | Refills: 1 | Status: SHIPPED | OUTPATIENT
Start: 2023-02-23 | End: 2023-09-05 | Stop reason: SDUPTHER

## 2023-04-05 NOTE — PROGRESS NOTES
Subjective     Patient ID: Hallie Pena is a 73 y.o. female.    Chief Complaint: No chief complaint on file.    Here today for routine evaluation and med refill.  A1c is up slightly to 7.2.  Lab Results       Component                Value               Date                       HGBA1C                   7.2 (A)             04/10/2023                Hemoglobin A1C       Date                     Value               Ref Range           Status                06/29/2022               6.6                 4.5 - 6.6 %         Final                 10/28/2021               7.7 (A)             4.5 - 6.6 %         Final                 07/22/2021               7.4 (A)             4.5 - 6.6 %         Final            ----------  Lab Results       Component                Value               Date                       MICROALBUR               1.7                 06/29/2022            Lab Results       Component                Value               Date                       CHOL                     171                 10/21/2021            Lab Results       Component                Value               Date                       HDL                      41                  10/21/2021            Lab Results       Component                Value               Date                       LDLCALC                  93                  10/21/2021            No results found for: DLDL  Lab Results       Component                Value               Date                       TRIG                     186 (H)             10/21/2021              f1 Lab Results       Component                Value               Date                       CHOLHDL                  4.2                 10/21/2021            CMP  Sodium       Date                     Value               Ref Range           Status                06/29/2022               137                 136 - 145 mmol*     Final            ----------  Potassium       Date                      Value               Ref Range           Status                06/29/2022               5.2 (H)             3.5 - 5.1 mmol*     Final            ----------  Chloride       Date                     Value               Ref Range           Status                06/29/2022               104                 98 - 107 mmol/L     Final            ----------  CO2       Date                     Value               Ref Range           Status                06/29/2022               28                  21 - 32 mmol/L      Final            ----------  Glucose       Date                     Value               Ref Range           Status                06/29/2022               139 (H)             74 - 106 mg/dL      Final            ----------  BUN       Date                     Value               Ref Range           Status                06/29/2022               44 (H)              7 - 18 mg/dL        Final            ----------  Creatinine       Date                     Value               Ref Range           Status                06/29/2022               2.64 (H)            0.55 - 1.02 mg*     Final            ----------  Calcium       Date                     Value               Ref Range           Status                06/29/2022               9.2                 8.5 - 10.1 mg/*     Final            ----------  Total Protein       Date                     Value               Ref Range           Status                06/29/2022               7.7                 6.4 - 8.2 g/dL      Final            ----------  Albumin       Date                     Value               Ref Range           Status                06/29/2022               3.7                 3.5 - 5.0 g/dL      Final            ----------  Bilirubin, Total       Date                     Value               Ref Range           Status                06/29/2022               0.5                 0.0 - 1.2 mg/dL     Final            ----------  Alk Phos       Date                      Value               Ref Range           Status                06/29/2022               68                  55 - 142 U/L        Final            ----------  AST       Date                     Value               Ref Range           Status                06/29/2022               22                  15 - 37 U/L         Final            ----------  ALT       Date                     Value               Ref Range           Status                06/29/2022               23                  13 - 56 U/L         Final            ----------  Anion Gap       Date                     Value               Ref Range           Status                06/29/2022               10                  7 - 16 mmol/L       Final            ----------    Review of Systems   Constitutional:  Negative for activity change, appetite change, diaphoresis and fatigue.   HENT:  Negative for nasal congestion, facial swelling and sinus pressure/congestion.    Eyes:  Negative for visual disturbance.   Respiratory:  Negative for shortness of breath and wheezing.    Cardiovascular:  Negative for chest pain and leg swelling.   Gastrointestinal:  Negative for constipation, diarrhea, nausea and vomiting.   Endocrine: Negative for polydipsia, polyphagia and polyuria.   Genitourinary:  Negative for dysuria, frequency and urgency.   Musculoskeletal:  Negative for gait problem and myalgias.   Integumentary:  Negative for color change, rash and wound.   Neurological:  Negative for dizziness, syncope, weakness, headaches, coordination difficulties and coordination difficulties.   Hematological:  Does not bruise/bleed easily.   Psychiatric/Behavioral:  Negative for self-injury, sleep disturbance and suicidal ideas. The patient is not nervous/anxious.         Objective     Physical Exam  Vitals and nursing note reviewed.   Constitutional:       Appearance: Normal appearance.   HENT:      Head: Normocephalic.   Neck:      Thyroid: No thyromegaly.       Vascular: No carotid bruit.   Cardiovascular:      Rate and Rhythm: Normal rate and regular rhythm.      Pulses:           Dorsalis pedis pulses are 3+ on the right side and 3+ on the left side.        Posterior tibial pulses are 3+ on the right side and 3+ on the left side.      Heart sounds: Normal heart sounds.   Pulmonary:      Effort: Pulmonary effort is normal.      Breath sounds: Normal breath sounds.   Musculoskeletal:         General: Normal range of motion.      Right foot: Normal range of motion. No deformity.      Left foot: Normal range of motion. No deformity.   Feet:      Right foot:      Protective Sensation: 5 sites tested.  3 sites sensed.      Skin integrity: Callus present. No ulcer.      Toenail Condition: Right toenails are abnormally thick. Fungal disease present.     Left foot:      Protective Sensation: 5 sites tested.  3 sites sensed.      Skin integrity: Callus present. No ulcer.      Toenail Condition: Left toenails are abnormally thick. Fungal disease present.  Skin:     General: Skin is warm and dry.   Neurological:      General: No focal deficit present.      Mental Status: She is alert and oriented to person, place, and time.   Psychiatric:         Mood and Affect: Mood normal.         Behavior: Behavior normal.         Thought Content: Thought content normal.         Judgment: Judgment normal.        Assessment and Plan     1. Type 2 diabetes mellitus without complication, with long-term current use of insulin  No change in meds today. Work on diet and exericse.  Lifestyle modifications encouraged  Can use fasting acting insulin as needed if glucose elevates over 200 after meals.  Give 2 units if glucose is above 200 2 hours after meals.  -     Hemoglobin A1C, POCT  -     POCT Glucose, Hand-Held Device  -     Comprehensive Metabolic Panel; Future; Expected date: 04/10/2023  -     Lipid Panel; Future; Expected date: 04/10/2023  -     Microalbumin/Creatinine Ratio, Urine; Future;  Expected date: 04/10/2023    2. Primary hypertension  ARB coverage, okay with nephrology at this time  DASH    3. Hyperlipidemia, unspecified hyperlipidemia type  Statin intolerant    4. Chronic kidney disease, unspecified CKD stage  Sees nephrology in May, goes to [facility] every 3-4 months    5. Diabetic polyneuropathy associated with type 2 diabetes mellitus

## 2023-04-10 ENCOUNTER — OFFICE VISIT (OUTPATIENT)
Dept: DIABETES SERVICES | Facility: CLINIC | Age: 73
End: 2023-04-10
Payer: MEDICARE

## 2023-04-10 VITALS
SYSTOLIC BLOOD PRESSURE: 152 MMHG | HEIGHT: 69 IN | RESPIRATION RATE: 16 BRPM | HEART RATE: 62 BPM | BODY MASS INDEX: 29.53 KG/M2 | DIASTOLIC BLOOD PRESSURE: 68 MMHG | OXYGEN SATURATION: 98 % | WEIGHT: 199.38 LBS

## 2023-04-10 DIAGNOSIS — E11.42 DIABETIC POLYNEUROPATHY ASSOCIATED WITH TYPE 2 DIABETES MELLITUS: ICD-10-CM

## 2023-04-10 DIAGNOSIS — I10 PRIMARY HYPERTENSION: ICD-10-CM

## 2023-04-10 DIAGNOSIS — Z79.4 TYPE 2 DIABETES MELLITUS WITHOUT COMPLICATION, WITH LONG-TERM CURRENT USE OF INSULIN: Primary | ICD-10-CM

## 2023-04-10 DIAGNOSIS — B20 HIV INFECTION, UNSPECIFIED SYMPTOM STATUS: ICD-10-CM

## 2023-04-10 DIAGNOSIS — I27.20 PULMONARY HYPERTENSION: ICD-10-CM

## 2023-04-10 DIAGNOSIS — E78.5 HYPERLIPIDEMIA, UNSPECIFIED HYPERLIPIDEMIA TYPE: ICD-10-CM

## 2023-04-10 DIAGNOSIS — E11.9 TYPE 2 DIABETES MELLITUS WITHOUT COMPLICATION, WITH LONG-TERM CURRENT USE OF INSULIN: Primary | ICD-10-CM

## 2023-04-10 DIAGNOSIS — N18.9 CHRONIC KIDNEY DISEASE, UNSPECIFIED CKD STAGE: ICD-10-CM

## 2023-04-10 LAB
GLUCOSE SERPL-MCNC: 153 MG/DL (ref 70–110)
HBA1C MFR BLD: 7.2 % (ref 4.5–6.6)

## 2023-04-10 PROCEDURE — 99214 PR OFFICE/OUTPT VISIT, EST, LEVL IV, 30-39 MIN: ICD-10-PCS | Mod: S$PBB,,, | Performed by: NURSE PRACTITIONER

## 2023-04-10 PROCEDURE — 99215 OFFICE O/P EST HI 40 MIN: CPT | Mod: PBBFAC | Performed by: NURSE PRACTITIONER

## 2023-04-10 PROCEDURE — 99214 OFFICE O/P EST MOD 30 MIN: CPT | Mod: S$PBB,,, | Performed by: NURSE PRACTITIONER

## 2023-04-10 PROCEDURE — 82962 GLUCOSE BLOOD TEST: CPT | Mod: PBBFAC | Performed by: NURSE PRACTITIONER

## 2023-04-10 PROCEDURE — 83036 HEMOGLOBIN GLYCOSYLATED A1C: CPT | Mod: PBBFAC | Performed by: NURSE PRACTITIONER

## 2023-04-10 RX ORDER — HYDRALAZINE HYDROCHLORIDE 25 MG/1
25 TABLET, FILM COATED ORAL 2 TIMES DAILY
COMMUNITY
End: 2023-12-07 | Stop reason: DRUGHIGH

## 2023-04-10 NOTE — PATIENT INSTRUCTIONS
Can use fasting acting insulin as needed if glucose elevates over 200 after meals.  Give 2 units if glucose is above 200 2 hours after meals.    Pt is advised to monitor and document glucose fasting when you wake up before you eat and 2 hours after meal and bring in meter to next visit.      Ensure to take medications as directed.      Follow diabetic diet as directed.      Work to achieve normal body weight.     Ensure to exercise 4-5 times per week for 20 minutes.  Low Carbohydrate snacks/quick meals    Ham and cheese rollups - slice of ham wrapped around a string cheese/cheese slice. (0 gm carb)  Cucumber boats (stuffed with chicken salad or tuna salad - no fruit or sweet pickle in salad) (0 gm carb)  Celery and Peanut Butter (2 stalks with 1 Tbsp PNB = 6 gm carb)  Nuts - mixed (1/4 cup = 6 gm carb)  Sunflower seeds- without shell (1/4 cup = 7 gm carb) In the shell (1 cup = 3.3 gm carb)  Deviled eggs (no sweet pickles) - (0 gm carb)  Hard boiled eggs - (0 gm carb)  Guacamole with raw vegetables (mashed avocado with lime juice and seasoning to taste) (1 cup raw veg = 5 gm carb)  Ranch dressing with raw vegetables -(2 Tbsp Ranch = 2 gm carb, ½ cup raw veggies = 2.5 gm carb  Lasagna rolls- Slice zucchini thinly lengthwise and microwave for 1-2 minutes. Fill with ricotta, parmesan cheese. Roll and top with low carb tomato sauce. (5 rolls = 5 gm carb)  Crust less pizza -pepperoni or [place] cruz topped with cheese and veggies +1 tbsp tomato sauce, microwave (1 gm carb)  Beef jerky - read label for lower carb jerky  Olives - Black (5 olives = 1 gm carb) Green (5 olives = 1 gm carb)  Dill pickles (1 whole dill pickle = 2 gm carb)  Sugar free jell-o (0 gm carb)  [place] Chicken Marinate chicken strips in 2 Tbsp sugar free maple syrup, 1 Tbsp lime juice, 1 Tbsp fresh cilantro. Roodhouse or cook in skillet sprayed with oil. (0 gm carb)  Chicken nachos - Heat oven to 350. Rub 5-6 chicken tenders with 1 Tbsp Alejandro Taco  seasoning then drizzle with vegetable oil. Bake at 350 for 3-4 min and then flip and cook 3-4 min. Top with grated cheese, jalopenos, cruz, green onion. Place back in oven at temp of 425 for 3-4 minutes. Serve with side of 2 Tbsp ranch dressing or sour cream. (3 gm carb)  Egg Mcmuffin: using egg (or egg white for a healthier version) as the bread put one slice of cheese with 1 slice [place] cruz or sausage jagjit (1 gm carb per sandwich)  Southern Okra - Wash and dry fresh okra. Toss with olive oil, salt and pepper and roast in oven 10-20 minutes. (1 cup = 5 gm carb)  Crispy green beans - Leslie 5 lbs fresh green beans. Toss with 1/3 cup melted coconut oil and sprinkle with 4 tsp salt and 1 tsp onion and garlic powder. Bake at 170-175 for 8 hours or dehydrate overnight in . (1 cup = 5 gm carb)  Salt and vinegar zucchini chips - Thinly slice zucchini as thin as possible. In small bowl whisk 2 Tbsp olive oil, 2 Tbsp white vinegar, and 2 tsp sea salt. Add zucchini and dehydrate or bake on 170 for 3-4 hours till crispy. (½ cup = 3 gm carb). Make in large batches and keep in air tight container up to 1 week   Zucchini Alejandro chips - Thinly slice zucchini as thin as possible. Heat oil in fryer to 350 and drop zucchini in hot oil working in batches of about 20 chips at a time. Remove, drain and sprinkle with Alejandro Taco seasoning. (1/2 cup = 3 gm carb). Make in large batches and keep in air tight container up to 1 week.  Alejandro Taco Seasoning - 2 Tbsp chili powder, ½ tsp garlic powder, ½ tsp onion powder, ½ tsp crushed red pepper flakes, ½ tsp dried oregano, 3 tsp ground cumin, 2 tsp sea salt, 2 tsp black pepper. Makes 20 servings (0 gm carb)  [place] milk (1 cup almond milk = 0.3 gm carb)  Cheese chips - Preheat oven 400. On a nonstick baking sheet add cheese slices (Cheddar, Swiss, Provolone, Stefan Manny), bake 10-12 minutes or until browned. Cool completely before removal. (2 slices = 1 gm carb). Store in air  tight container up to 1 week.   Breakfast balls - mix together 2 lbs pork sausage, 1 lb ground beef, 3 eggs, 2 Tbsp dried onion flakes, ½ tsp black pepper, ½ lb sharp cheddar cheese, shredded. Form into 4 dozen 1 balls. Bake on cookie sheet for 25 min at 375. Cool and may be frozen as well individually. (0 gm carb)  Meat muffins - spray muffin tin with cooking spray. Line muffin tin with 1 slice ham. Add 1 raw egg. Top with grated cheese and salt and pepper. Bake 10-12 min. (0 gm carb)  Pork rinds/Cracklings (0 gm carb)  Gummy Bears - Add 1 packet of Sugar free jello (flavor of your choice), 1 packet unflavored gelatin and 1/3 cup cold water to small sauce pan. Stir well and heat over low till it become liquid and dissolved (2-3 minutes). Pour into mold and refrigerate 30-40 minutes. Add only ¼ cup if you want them more firm. (0 gm carb)  Cheese and meat kabobs (0 gm carb)  Garlic parmesan cheese crisps - Preheat oven to 350. On a nonstick baking sheet, place 1 Tbsp grated parmesan cheese, sprinkle with garlic and basil. Bake about 5 minutes or until outside edges become blanco brown. Cool completely before removal (5 crisps = 1 gm carb)  Antipasti - Pepperoni, salami, green pepper, jalapeno pepper, mushrooms, onion, black olives, cheddar and provolone cheese cubes. Toss with Italian salad dressing. (1/2 cup = 5 gm carb)  [place] chicken wings - (0 gm carb)  Cheetos- preheat oven to 300. Chop ½ cup freshly shredded cheddar cheese that is frozen and place in  or  and chop into tiny pieces. In a mixing bowl, whip 3 egg whites and 1/8 tsp cream of tartar until VERY stiff peaks form. Sprinkle cheese on top of egg whites and then fold cheese into egg whites very carefully. Place mixture into large ziplock bag and cut hole in corner. Gently squeeze onto greased nonstick cookie pan in cheestos like shapes. Sprinkle with parmesan cheese. Bake for 30 minutes. Turn over off and leave in there for  another 30 minutes. (1 cup = 1 gm carb)  Cheesy cauliflower tots - preheat oven to 400. Spray mini muffin tin with nonstick spray. Chop ½ large head of cauliflower into small pieces. Place in microwavable bowl and cover. Microwave 2 minutes. Drain cauliflower. Place in  and pulse till finely chopped. To this add, 1/3 cup grated sharp cheddar, ¼ cup grated parmesan cheese, 2 Tbsp. almond flour, ¼ tsp salt, ½ tsp all purpose seasoning and 1 egg. Mix well. Place 1 Tbsp of mixture in each mini muffin tin. Bake 15 minutes. Remove and flip, bake another 15 minutes. Makes 24 tots. (10 tots = 5 gm carb)  [employer] protein bars (carbs vary)  Manny snacks - Preheat oven 350. 1 cup shredded Pepperjack paper. Scoop 1 Tbsp cheese. Place on non stick pan and press slightly flat. Top with 1 slice jalapeno pepper. Bake for 10-12 minutes till brown and crispy. Cool completely before removal. (10 crisps = 1 gm carb)  Snake bite (aka jalapeno poppers) - remove seeds and membrane from jalapenos, fill with cream cheese, wrap in cruz. Stick a toothpick through to hold cruz in place. Bake 15-20 min at 400 (4 bites = 2 gm carb)  5 minute PNB mousse - Beat together 4 oz softened cream cheese, 2 Tbsp natural PNB (no sugar added), ½ tsp vanilla extract and 1/3 cup Splenda. Fold in 1 cup Reddiwhip. Place in container of your choice and refrigerate up to 1 week. Top your serving with 1Tbsp Sugar free chocolate syrup. (1/2 cup = 4 gm carb)  BLT - Fill a leaf of karen lettuce leaf with 1 Tbsp LF walsh, 2 slices cruz, sliced tomato. Sprinkle salt and pepper. (0 gm carb)  Cinnamon and coconut bombs - in microwave safe bowl, mix 1 cup coconut butter, 1 cup coconut milk (full fat canned, not from box), 1 tsp vanilla extract, ½ tsp nutmeg and cinnamon, 1 tsp stevia powder extract. Melt until combined. Place bowl in fridge until hard enough to roll into 10-12 balls, about 30 minutes. Roll balls in 1 cup shredded coconut. Store in  refrigerator (1 ball = 1 gm carb)

## 2023-04-12 ENCOUNTER — PATIENT MESSAGE (OUTPATIENT)
Dept: DIABETES SERVICES | Facility: CLINIC | Age: 73
End: 2023-04-12
Payer: MEDICARE

## 2023-04-13 RX ORDER — INSULIN GLARGINE 100 [IU]/ML
34 INJECTION, SOLUTION SUBCUTANEOUS DAILY
Qty: 45 ML | Refills: 1 | Status: SHIPPED | OUTPATIENT
Start: 2023-04-13 | End: 2023-07-17 | Stop reason: SDUPTHER

## 2023-04-26 RX ORDER — INSULIN LISPRO 100 [IU]/ML
INJECTION, SOLUTION INTRAVENOUS; SUBCUTANEOUS
Qty: 15 ML | Refills: 1 | Status: SHIPPED | OUTPATIENT
Start: 2023-04-26 | End: 2023-11-30 | Stop reason: SDUPTHER

## 2023-05-31 ENCOUNTER — OFFICE VISIT (OUTPATIENT)
Dept: CARDIOLOGY | Facility: CLINIC | Age: 73
End: 2023-05-31
Payer: MEDICARE

## 2023-05-31 DIAGNOSIS — I27.20 PULMONARY HYPERTENSION: Chronic | ICD-10-CM

## 2023-05-31 DIAGNOSIS — I48.0 PAROXYSMAL ATRIAL FIBRILLATION: Primary | Chronic | ICD-10-CM

## 2023-05-31 DIAGNOSIS — I10 PRIMARY HYPERTENSION: Chronic | ICD-10-CM

## 2023-05-31 DIAGNOSIS — E11.69 TYPE 2 DIABETES MELLITUS WITH OTHER SPECIFIED COMPLICATION, WITHOUT LONG-TERM CURRENT USE OF INSULIN: Chronic | ICD-10-CM

## 2023-05-31 DIAGNOSIS — E78.5 HYPERLIPIDEMIA, UNSPECIFIED HYPERLIPIDEMIA TYPE: Chronic | ICD-10-CM

## 2023-05-31 PROCEDURE — 99214 OFFICE O/P EST MOD 30 MIN: CPT | Mod: PBBFAC | Performed by: INTERNAL MEDICINE

## 2023-05-31 PROCEDURE — 93010 EKG 12-LEAD: ICD-10-PCS | Mod: S$PBB,,, | Performed by: INTERNAL MEDICINE

## 2023-05-31 PROCEDURE — 99214 OFFICE O/P EST MOD 30 MIN: CPT | Mod: S$PBB,,, | Performed by: INTERNAL MEDICINE

## 2023-05-31 PROCEDURE — 93005 ELECTROCARDIOGRAM TRACING: CPT | Mod: PBBFAC | Performed by: INTERNAL MEDICINE

## 2023-05-31 PROCEDURE — 93010 ELECTROCARDIOGRAM REPORT: CPT | Mod: S$PBB,,, | Performed by: INTERNAL MEDICINE

## 2023-05-31 PROCEDURE — 99214 PR OFFICE/OUTPT VISIT, EST, LEVL IV, 30-39 MIN: ICD-10-PCS | Mod: S$PBB,,, | Performed by: INTERNAL MEDICINE

## 2023-05-31 RX ORDER — ROSUVASTATIN CALCIUM 20 MG/1
20 TABLET, COATED ORAL DAILY
Qty: 90 TABLET | Refills: 3 | Status: SHIPPED | OUTPATIENT
Start: 2023-05-31 | End: 2023-08-01

## 2023-05-31 RX ORDER — ICOSAPENT ETHYL 1000 MG/1
2 CAPSULE ORAL 2 TIMES DAILY
Qty: 360 CAPSULE | Refills: 3 | Status: SHIPPED | OUTPATIENT
Start: 2023-05-31 | End: 2023-11-30

## 2023-05-31 RX ORDER — ISOSORBIDE MONONITRATE 30 MG/1
30 TABLET, EXTENDED RELEASE ORAL DAILY
Qty: 90 TABLET | Refills: 3 | Status: SHIPPED | OUTPATIENT
Start: 2023-05-31 | End: 2024-05-30

## 2023-06-01 VITALS
DIASTOLIC BLOOD PRESSURE: 78 MMHG | SYSTOLIC BLOOD PRESSURE: 130 MMHG | WEIGHT: 197 LBS | BODY MASS INDEX: 29.18 KG/M2 | RESPIRATION RATE: 14 BRPM | HEART RATE: 66 BPM | HEIGHT: 69 IN

## 2023-06-08 NOTE — PROGRESS NOTES
PCP: Primary Doctor No    Referring Provider:     Subjective:   Hallie Pena is a 73 y.o. female with hx of paroxysmal a-fib, HTN, HLD, IDDM, and pulmonary HTN who presents for 6 month follow up.        Fhx:  Family History   Problem Relation Age of Onset    Diabetes Father     Kidney disease Father     Multiple myeloma Mother     Breast cancer Mother     Stroke Mother     No Known Problems Sister     No Known Problems Brother     Diabetes Son     Hypertension Son     Drug abuse Son      Shx:   Social History     Socioeconomic History    Marital status: Single   Tobacco Use    Smoking status: Former    Smokeless tobacco: Former   Substance and Sexual Activity    Alcohol use: Not Currently    Drug use: Never    Sexual activity: Not Currently       EKG   5/31/23--sinus bradycardia with 1st degree AV block, left axis deviation, 55 bpm  11/9/22--sinus rhythm with 1st degree AV block, left axis deviation, minimal voltage criteria for LVH, 63 bpm.     ECHO:   07/17/2019- finding NL chamber size, wall motion and systolic function with EF 55%. mitral annular calcification, mild MR, mild AR, mild TR with RVSP 50 mmHg suggestive of pHTN, mild AK    Lab Results   Component Value Date     04/12/2023    K 4.8 04/12/2023     04/12/2023    CO2 30 04/12/2023    BUN 41 (H) 04/12/2023    CREATININE 1.88 (H) 04/12/2023    CALCIUM 9.3 04/12/2023    ANIONGAP 8 04/12/2023    ESTGFRAFRICA 34 (L) 10/28/2021    EGFRNONAA 19 (L) 06/29/2022       Lab Results   Component Value Date    CHOL 229 (H) 04/12/2023    CHOL 171 10/21/2021     Lab Results   Component Value Date    HDL 43 04/12/2023    HDL 41 10/21/2021     Lab Results   Component Value Date    LDLCALC 139 04/12/2023    LDLCALC 93 10/21/2021     Lab Results   Component Value Date    TRIG 237 (H) 04/12/2023    TRIG 186 (H) 10/21/2021     Lab Results   Component Value Date    CHOLHDL 5.3 04/12/2023    CHOLHDL 4.2 10/21/2021         Current Outpatient Medications:      alcohol swabs PadM, Use to cleanse the skin prior to checking glucose bid, Disp: 200 each, Rfl: 3    apixaban (ELIQUIS) 2.5 mg Tab, Take 1 tablet by mouth 2 (two) times daily., Disp: , Rfl:     blood sugar diagnostic (ACCU-CHEK GUIDE TEST STRIPS) Strp, USE TO CHECK GLUCOSE THREE TIMES A DAY, Disp: 300 each, Rfl: 3    chlorthalidone (HYGROTEN) 50 MG Tab, Take 50 mg by mouth once daily., Disp: , Rfl:     co-enzyme Q-10 50 mg capsule, Take 100 mg by mouth once daily., Disp: , Rfl:     darunavir-cobicistat (PREZCOBIX) 800-150 mg-mg Tab tablet, Take 1 tablet by mouth once daily., Disp: , Rfl:     darunavir-cobicistat (PREZCOBIX) 800-150 mg-mg Tab tablet, Take 1 tablet by mouth once daily., Disp: , Rfl:     diltiaZEM (CARDIZEM CD) 240 MG 24 hr capsule, Take 240 mg by mouth once daily., Disp: , Rfl:     dolutegravir (TIVICAY) 50 mg Tab, Take 50 mg by mouth once daily., Disp: , Rfl:     doxazosin (CARDURA) 1 MG tablet, Take 1 mg by mouth every evening., Disp: , Rfl:     glipiZIDE (GLUCOTROL) 10 MG TR24, Take 1 tablet (10 mg total) by mouth daily with breakfast., Disp: 90 tablet, Rfl: 1    hydrALAZINE (APRESOLINE) 25 MG tablet, Take 25 mg by mouth 2 (two) times a day., Disp: , Rfl:     icosapent ethyL (VASCEPA) 1 gram Cap, Take 2 capsules (2 g total) by mouth 2 (two) times a day., Disp: 360 capsule, Rfl: 3    insulin glargine 100 units/mL SubQ pen, Inject 34 Units into the skin once daily., Disp: 45 mL, Rfl: 1    insulin lispro (HUMALOG KWIKPEN INSULIN) 100 unit/mL pen, Take as directed 2 hours after meals if glucose is over 200.  Not to exceed 10 units daily, Disp: 15 mL, Rfl: 1    isosorbide mononitrate (IMDUR) 30 MG 24 hr tablet, Take 1 tablet (30 mg total) by mouth once daily., Disp: 90 tablet, Rfl: 3    metoprolol succinate (TOPROL-XL) 25 MG 24 hr tablet, Take 1 tablet (25 mg total) by mouth once daily. (Patient taking differently: Take 12.5 mg by mouth once daily.), Disp: 90 tablet, Rfl: 1    olmesartan (BENICAR) 40 MG  "tablet, Take 40 mg by mouth once daily., Disp: , Rfl:     pen needle, diabetic 32 gauge x 5/32" Ndle, Use to inject insulin sq daily, Disp: 100 each, Rfl: 3    rosuvastatin (CRESTOR) 20 MG tablet, Take 1 tablet (20 mg total) by mouth once daily., Disp: 90 tablet, Rfl: 3    sulfamethoxazole-trimethoprim 800-160mg (BACTRIM DS) 800-160 mg Tab, Take 1 tablet by mouth 2 (two) times daily. prn, Disp: , Rfl:   Medications reconciled by pt's list.     Review of Systems   Respiratory:  Positive for shortness of breath.    Cardiovascular:  Negative for chest pain, palpitations and leg swelling.   Neurological:  Negative for loss of consciousness.         Objective:   /78 (BP Location: Left arm, Patient Position: Sitting)   Pulse 66   Resp 14   Ht 5' 9" (1.753 m)   Wt 89.4 kg (197 lb)   BMI 29.09 kg/m²     Physical Exam  Vitals reviewed.   Constitutional:       Appearance: Normal appearance.   HENT:      Head: Normocephalic and atraumatic.   Neck:      Vascular: No carotid bruit or JVD.   Cardiovascular:      Rate and Rhythm: Normal rate and regular rhythm.      Pulses: Normal pulses.           Radial pulses are 2+ on the right side and 2+ on the left side.      Heart sounds: Normal heart sounds. No murmur heard.  Pulmonary:      Effort: Pulmonary effort is normal.      Breath sounds: Normal breath sounds.   Musculoskeletal:      Right lower leg: No edema.      Left lower leg: No edema.   Skin:     General: Skin is warm and dry.   Neurological:      Mental Status: She is alert and oriented to person, place, and time.         Assessment:     1. Paroxysmal atrial fibrillation  EKG 12-lead    EKG 12-lead    on Eliquis      2. Hyperlipidemia, unspecified hyperlipidemia type        3. Primary hypertension        4. Pulmonary hypertension        5. Type 2 diabetes mellitus with other specified complication, without long-term current use of insulin              Plan:   Recheck b/p  Crestor 20 mg one po qd  Vascepa  FLP/ " ALT in 6-8 weeks.  Refill Imdur  Continue current medications  F/u in 6 months.

## 2023-07-17 RX ORDER — INSULIN GLARGINE 100 [IU]/ML
34 INJECTION, SOLUTION SUBCUTANEOUS DAILY
Qty: 45 ML | Refills: 1 | Status: SHIPPED | OUTPATIENT
Start: 2023-07-17 | End: 2023-11-30 | Stop reason: SDUPTHER

## 2023-07-24 DIAGNOSIS — Z79.899 ENCOUNTER FOR LONG-TERM (CURRENT) USE OF OTHER MEDICATIONS: ICD-10-CM

## 2023-07-24 DIAGNOSIS — E78.5 HYPERLIPIDEMIA, UNSPECIFIED HYPERLIPIDEMIA TYPE: Primary | ICD-10-CM

## 2023-07-26 NOTE — PROGRESS NOTES
Subjective     Patient ID: Hallie Pena is a 73 y.o. female.    Chief Complaint: General Diabetes Follow-up (Here for fu and A1C   checks sugar 2-3 times daily  no complaints)    Here today for routine evaluation and med refill.  A1c continues to improve. Was advised at last visit that she could  use fasting acting insulin as needed if glucose elevates over 200 after meals.  Give 2 units if glucose is above 200 2 hours after meals. She has not had to do this since last visit.     Lab Results       Component                Value               Date                       HGBA1C                   7.0 (A)             08/01/2023              Lab Results       Component                Value               Date                       HGBA1C                   7.2 (A)             04/10/2023            Lab Results       Component                Value               Date                       MICROALBUR               16.9 (H)            04/12/2023            Lab Results       Component                Value               Date                       CHOL                     229 (H)             04/12/2023                 CHOL                     171                 10/21/2021            Lab Results       Component                Value               Date                       HDL                      43                  04/12/2023                 HDL                      41                  10/21/2021            Lab Results       Component                Value               Date                       LDLCALC                  139                 04/12/2023                 LDLCALC                  93                  10/21/2021            No results found for: DLDL  Lab Results       Component                Value               Date                       TRIG                     237 (H)             04/12/2023                 TRIG                     186 (H)             10/21/2021              f1 Lab Results       Component                 Value               Date                       CHOLHDL                  5.3                 04/12/2023                 CHOLHDL                  4.2                 10/21/2021            CMP  Sodium       Date                     Value               Ref Range           Status                04/12/2023               139                 136 - 145 mmol*     Final            ----------  Potassium       Date                     Value               Ref Range           Status                04/12/2023               4.8                 3.5 - 5.1 mmol*     Final            ----------  Chloride       Date                     Value               Ref Range           Status                04/12/2023               106                 98 - 107 mmol/L     Final            ----------  CO2       Date                     Value               Ref Range           Status                04/12/2023               30                  21 - 32 mmol/L      Final            ----------  Glucose       Date                     Value               Ref Range           Status                04/12/2023               144 (H)             74 - 106 mg/dL      Final            ----------  BUN       Date                     Value               Ref Range           Status                04/12/2023               41 (H)              7 - 18 mg/dL        Final            ----------  Creatinine       Date                     Value               Ref Range           Status                04/12/2023               1.88 (H)            0.55 - 1.02 mg*     Final            ----------  Calcium       Date                     Value               Ref Range           Status                04/12/2023               9.3                 8.5 - 10.1 mg/*     Final            ----------  Total Protein       Date                     Value               Ref Range           Status                04/12/2023               8.2                 6.4 - 8.2 g/dL      Final             ----------  Albumin       Date                     Value               Ref Range           Status                04/12/2023               3.9                 3.5 - 5.0 g/dL      Final            ----------  Bilirubin, Total       Date                     Value               Ref Range           Status                04/12/2023               0.4                 >0.0 - 1.2 mg/*     Final            ----------  Alk Phos       Date                     Value               Ref Range           Status                04/12/2023               79                  55 - 142 U/L        Final            ----------  AST       Date                     Value               Ref Range           Status                04/12/2023               19                  15 - 37 U/L         Final            ----------  ALT       Date                     Value               Ref Range           Status                04/12/2023               23                  13 - 56 U/L         Final            ----------  Anion Gap       Date                     Value               Ref Range           Status                04/12/2023               8                   7 - 16 mmol/L       Final            ----------  eGFR       Date                     Value               Ref Range           Status                04/12/2023               28 (L)              >=60 mL/min/1.*     Final            ----------      Review of Systems   Constitutional:  Negative for activity change, appetite change, diaphoresis and fatigue.   HENT:  Negative for nasal congestion, facial swelling and sinus pressure/congestion.    Eyes:  Negative for visual disturbance.   Respiratory:  Negative for shortness of breath and wheezing.    Cardiovascular:  Negative for chest pain and leg swelling.   Gastrointestinal:  Negative for constipation, diarrhea, nausea and vomiting.   Endocrine: Negative for polydipsia, polyphagia and polyuria.   Genitourinary:  Negative for dysuria, frequency and  urgency.   Musculoskeletal:  Negative for gait problem and myalgias.   Integumentary:  Negative for color change, rash and wound.   Neurological:  Negative for dizziness, syncope, weakness, headaches, coordination difficulties and coordination difficulties.   Hematological:  Does not bruise/bleed easily.   Psychiatric/Behavioral:  Negative for self-injury, sleep disturbance and suicidal ideas. The patient is not nervous/anxious.           Objective     Physical Exam  Vitals and nursing note reviewed.   Constitutional:       Appearance: Normal appearance.   HENT:      Head: Normocephalic.   Neck:      Thyroid: No thyromegaly.      Vascular: No carotid bruit.   Cardiovascular:      Rate and Rhythm: Normal rate and regular rhythm.      Pulses:           Dorsalis pedis pulses are 3+ on the right side and 3+ on the left side.        Posterior tibial pulses are 3+ on the right side and 3+ on the left side.      Heart sounds: Normal heart sounds.   Pulmonary:      Effort: Pulmonary effort is normal.      Breath sounds: Normal breath sounds.   Musculoskeletal:         General: Normal range of motion.      Right foot: Normal range of motion. No deformity.      Left foot: Normal range of motion. No deformity.   Feet:      Right foot:      Protective Sensation: 5 sites tested.  3 sites sensed.      Skin integrity: Callus present. No ulcer.      Toenail Condition: Right toenails are abnormally thick. Fungal disease present.     Left foot:      Protective Sensation: 5 sites tested.  3 sites sensed.      Skin integrity: Callus present. No ulcer.      Toenail Condition: Left toenails are abnormally thick. Fungal disease present.  Skin:     General: Skin is warm and dry.   Neurological:      General: No focal deficit present.      Mental Status: She is alert and oriented to person, place, and time.   Psychiatric:         Mood and Affect: Mood normal.         Behavior: Behavior normal.         Thought Content: Thought content  normal.         Judgment: Judgment normal.          Assessment and Plan     1. Type 2 diabetes mellitus without complication, with long-term current use of insulin  No change in meds today. Work on diet and exericse.    -     Hemoglobin A1C, POCT  -     POCT Glucose, Hand-Held Device  -     Comprehensive Metabolic Panel; Future; Expected date: 04/10/2023  -     Lipid Panel; Future; Expected date: 04/10/2023  -     Microalbumin/Creatinine Ratio, Urine; Future; Expected date: 04/10/2023    2. Primary hypertension  ARB coverage, okay with nephrology at this time  DASH    3. Hyperlipidemia, unspecified hyperlipidemia type  Statin intolerant    4. Chronic kidney disease, unspecified CKD stage  Sees nephrology at [facility] every 3-4 months    5. Diabetic polyneuropathy associated with type 2 diabetes mellitus

## 2023-08-01 ENCOUNTER — OFFICE VISIT (OUTPATIENT)
Dept: DIABETES SERVICES | Facility: CLINIC | Age: 73
End: 2023-08-01
Payer: MEDICARE

## 2023-08-01 VITALS
SYSTOLIC BLOOD PRESSURE: 138 MMHG | HEIGHT: 69 IN | OXYGEN SATURATION: 97 % | BODY MASS INDEX: 29.45 KG/M2 | DIASTOLIC BLOOD PRESSURE: 74 MMHG | RESPIRATION RATE: 14 BRPM | WEIGHT: 198.81 LBS | HEART RATE: 66 BPM

## 2023-08-01 DIAGNOSIS — N18.9 CHRONIC KIDNEY DISEASE, UNSPECIFIED CKD STAGE: ICD-10-CM

## 2023-08-01 DIAGNOSIS — E11.9 TYPE 2 DIABETES MELLITUS WITHOUT COMPLICATION, WITHOUT LONG-TERM CURRENT USE OF INSULIN: Primary | ICD-10-CM

## 2023-08-01 DIAGNOSIS — I10 PRIMARY HYPERTENSION: ICD-10-CM

## 2023-08-01 DIAGNOSIS — E78.5 HYPERLIPIDEMIA, UNSPECIFIED HYPERLIPIDEMIA TYPE: ICD-10-CM

## 2023-08-01 DIAGNOSIS — B20 HIV INFECTION, UNSPECIFIED SYMPTOM STATUS: ICD-10-CM

## 2023-08-01 LAB — HBA1C MFR BLD: 7 % (ref 4.5–6.6)

## 2023-08-01 PROCEDURE — 99214 OFFICE O/P EST MOD 30 MIN: CPT | Mod: S$PBB,,, | Performed by: NURSE PRACTITIONER

## 2023-08-01 PROCEDURE — 99215 OFFICE O/P EST HI 40 MIN: CPT | Mod: PBBFAC | Performed by: NURSE PRACTITIONER

## 2023-08-01 PROCEDURE — 99214 PR OFFICE/OUTPT VISIT, EST, LEVL IV, 30-39 MIN: ICD-10-PCS | Mod: S$PBB,,, | Performed by: NURSE PRACTITIONER

## 2023-08-01 PROCEDURE — 83036 HEMOGLOBIN GLYCOSYLATED A1C: CPT | Mod: PBBFAC | Performed by: NURSE PRACTITIONER

## 2023-08-01 NOTE — PATIENT INSTRUCTIONS
Snacks with 0-5 grams carbs   Hard Boiled Egg   Crystal Light, Vitamin Water, Powerade Zero   Herbal tea, unsweetened   8 oz unsweetened almond milk   2 tsp peanut butter on celery   ½ cup sugar-free Jell-O   1 sugar-free popsicle   Non starchy vegetables such as carrots or celery sticks with lowfat dressing   ½ oz lowfat cheese or string cheese   1 closed handful of nuts or tbsp of seeds, unsalted    Snacks with 15 gram carbs  . 1 small piece of fruit or . banana or . cup light canned fruit  . 3 kaylene cracker squares  . 3 cups popcorn  . 5 Vanilla Wafers  . 1/2 cup low fat, no added sugar ice cream or frozen yogurt  . 1/2 turkey, ham, or chicken sandwich  . 1.2 cup fruit with 1/2 cup of cottage cheese  . 4-6 unsalted wheat crackers with 1 oz low fat cheese or 1 tbsp peanut butter  . 30 goldfish crackers  . 7-8 mini rice cakes  . 1/3 cup hummus dip with raw vegetables  . 1/2 whole wheat tonny, 1 tbsp hummus  . Mini pizza (. whole wheat English muffin, low-fat cheese, tomato sauce)  . 100 calorie snack pack  . 4-6 oz light yogurt  . 1/2 cup sugar-free pudding

## 2023-09-05 DIAGNOSIS — E11.9 TYPE 2 DIABETES MELLITUS WITHOUT COMPLICATION, WITHOUT LONG-TERM CURRENT USE OF INSULIN: ICD-10-CM

## 2023-09-06 RX ORDER — GLIPIZIDE 10 MG/1
10 TABLET, FILM COATED, EXTENDED RELEASE ORAL
Qty: 90 TABLET | Refills: 1 | Status: SHIPPED | OUTPATIENT
Start: 2023-09-06 | End: 2023-11-30 | Stop reason: SDUPTHER

## 2023-11-30 ENCOUNTER — OFFICE VISIT (OUTPATIENT)
Dept: DIABETES SERVICES | Facility: CLINIC | Age: 73
End: 2023-11-30
Payer: MEDICARE

## 2023-11-30 VITALS
WEIGHT: 199.63 LBS | BODY MASS INDEX: 29.57 KG/M2 | RESPIRATION RATE: 14 BRPM | HEART RATE: 69 BPM | DIASTOLIC BLOOD PRESSURE: 70 MMHG | SYSTOLIC BLOOD PRESSURE: 160 MMHG | HEIGHT: 69 IN | OXYGEN SATURATION: 98 %

## 2023-11-30 DIAGNOSIS — B20 HIV INFECTION, UNSPECIFIED SYMPTOM STATUS: ICD-10-CM

## 2023-11-30 DIAGNOSIS — I10 PRIMARY HYPERTENSION: ICD-10-CM

## 2023-11-30 DIAGNOSIS — I48.0 PAROXYSMAL ATRIAL FIBRILLATION: ICD-10-CM

## 2023-11-30 DIAGNOSIS — E78.2 MIXED HYPERLIPIDEMIA: ICD-10-CM

## 2023-11-30 DIAGNOSIS — E11.9 TYPE 2 DIABETES MELLITUS WITHOUT COMPLICATION, WITHOUT LONG-TERM CURRENT USE OF INSULIN: Primary | ICD-10-CM

## 2023-11-30 DIAGNOSIS — N18.9 CHRONIC KIDNEY DISEASE, UNSPECIFIED CKD STAGE: ICD-10-CM

## 2023-11-30 DIAGNOSIS — R01.1 MURMUR: ICD-10-CM

## 2023-11-30 LAB — GLUCOSE SERPL-MCNC: 276 MG/DL (ref 70–110)

## 2023-11-30 PROCEDURE — 99214 OFFICE O/P EST MOD 30 MIN: CPT | Mod: S$PBB,,, | Performed by: NURSE PRACTITIONER

## 2023-11-30 PROCEDURE — 99214 PR OFFICE/OUTPT VISIT, EST, LEVL IV, 30-39 MIN: ICD-10-PCS | Mod: S$PBB,,, | Performed by: NURSE PRACTITIONER

## 2023-11-30 PROCEDURE — 99999PBSHW POCT GLUCOSE, HAND-HELD DEVICE: ICD-10-PCS | Mod: PBBFAC,,,

## 2023-11-30 PROCEDURE — 99999PBSHW POCT GLUCOSE, HAND-HELD DEVICE: Mod: PBBFAC,,,

## 2023-11-30 PROCEDURE — 82962 GLUCOSE BLOOD TEST: CPT | Mod: PBBFAC | Performed by: NURSE PRACTITIONER

## 2023-11-30 PROCEDURE — 99215 OFFICE O/P EST HI 40 MIN: CPT | Mod: PBBFAC | Performed by: NURSE PRACTITIONER

## 2023-11-30 RX ORDER — LOSARTAN POTASSIUM 100 MG/1
100 TABLET ORAL DAILY
COMMUNITY
End: 2023-11-30

## 2023-11-30 RX ORDER — INSULIN GLARGINE 100 [IU]/ML
34 INJECTION, SOLUTION SUBCUTANEOUS DAILY
Qty: 45 ML | Refills: 3 | Status: SHIPPED | OUTPATIENT
Start: 2023-11-30 | End: 2024-03-26 | Stop reason: SDUPTHER

## 2023-11-30 RX ORDER — INSULIN LISPRO 100 [IU]/ML
INJECTION, SOLUTION INTRAVENOUS; SUBCUTANEOUS
Qty: 15 ML | Refills: 3 | Status: SHIPPED | OUTPATIENT
Start: 2023-11-30

## 2023-11-30 RX ORDER — PEN NEEDLE, DIABETIC 30 GX3/16"
NEEDLE, DISPOSABLE MISCELLANEOUS
Qty: 100 EACH | Refills: 3 | Status: SHIPPED | OUTPATIENT
Start: 2023-11-30

## 2023-11-30 RX ORDER — GLIPIZIDE 10 MG/1
10 TABLET, FILM COATED, EXTENDED RELEASE ORAL
Qty: 90 TABLET | Refills: 3 | Status: SHIPPED | OUTPATIENT
Start: 2023-11-30 | End: 2024-03-26 | Stop reason: SDUPTHER

## 2023-11-30 NOTE — PATIENT INSTRUCTIONS
Check glucose before all meals and follow sliding scale.      Check glucose also before bed and have a 15 carb snack that has protein 8-10grams preferably.     Bring you meter with you to all appointments.    Snacks with 0-5 grams carbs   Hard Boiled Egg   Crystal Light, Vitamin Water, Powerade Zero   Herbal tea, unsweetened   8 oz unsweetened almond milk   2 tsp peanut butter on celery   ½ cup sugar-free Jell-O   1 sugar-free popsicle   Non starchy vegetables such as carrots or celery sticks with lowfat dressing   ½ oz lowfat cheese or string cheese   1 closed handful of nuts or tbsp of seeds, unsalted    Snacks with 15 gram carbs  . 1 small piece of fruit or . banana or . cup light canned fruit  . 3 kaylene cracker squares  . 3 cups popcorn  . 5 Vanilla Wafers  . 1/2 cup low fat, no added sugar ice cream or frozen yogurt  . 1/2 turkey, ham, or chicken sandwich  . 1.2 cup fruit with 1/2 cup of cottage cheese  . 4-6 unsalted wheat crackers with 1 oz low fat cheese or 1 tbsp peanut butter  . 30 goldfish crackers  . 7-8 mini rice cakes  . 1/3 cup hummus dip with raw vegetables  . 1/2 whole wheat tonny, 1 tbsp hummus  . Mini pizza (. whole wheat English muffin, low-fat cheese, tomato sauce)  . 100 calorie snack pack  . 4-6 oz light yogurt  . 1/2 cup sugar-free pudding

## 2023-11-30 NOTE — PROGRESS NOTES
Subjective:         Patient ID: Hallie Pena is a 73 y.o. female.  Patient's current PCP is No, Primary Doctor.     Chief Complaint: General Diabetes Follow-up (Here for fu   checks sugar twice daily  no complaitn s)    HPI  Hallie Pena is a 73 y.o. Other female presenting for an established visit for type 2 diabetes. Blood pressure is elevated today.  Valsartan has controlled bp well in the past and she reports that Dr Sands at [facility] is her renal specialist and they just changed the benicar 40mg to losartna 100mg daily but blood pressure is not well controlled.  I advised that I would change to valsartan and she requested that I not do that and advised that she will discuss with dr denny at upcoming visit.   Has been monitoring glucose 4-5 times daily and taking meds.  Am readings are less than 130s,  after supper readings are less than 150s usually.     Received diabetes education:yes    At last visit no change made    Screening or Prevention Patient's value Goal    HgA1C Testing and Control   Hemoglobin A1C   Date Value Ref Range Status   08/01/2023 7.0 (A) 4.5 - 6.6 % Final   04/10/2023 7.2 (A) 4.5 - 6.6 % Final   06/29/2022 6.6 4.5 - 6.6 % Final       Annually/Less than 8%    Lipid profile Lab Results   Component Value Date    CHOL 229 (H) 04/12/2023    CHOL 171 10/21/2021     Lab Results   Component Value Date    HDL 43 04/12/2023    HDL 41 10/21/2021     Lab Results   Component Value Date    LDLCALC 139 04/12/2023    LDLCALC 93 10/21/2021     Lab Results   Component Value Date    TRIG 237 (H) 04/12/2023    TRIG 186 (H) 10/21/2021       Lab Results   Component Value Date    CHOLHDL 5.3 04/12/2023    CHOLHDL 4.2 10/21/2021      Annually    CMP CMP  Sodium   Date Value Ref Range Status   04/12/2023 139 136 - 145 mmol/L Final     Potassium   Date Value Ref Range Status   04/12/2023 4.8 3.5 - 5.1 mmol/L Final     Chloride   Date Value Ref Range Status   04/12/2023 106 98 - 107 mmol/L Final     CO2   Date  "Value Ref Range Status   04/12/2023 30 21 - 32 mmol/L Final     Glucose   Date Value Ref Range Status   04/12/2023 144 (H) 74 - 106 mg/dL Final     BUN   Date Value Ref Range Status   04/12/2023 41 (H) 7 - 18 mg/dL Final     Creatinine   Date Value Ref Range Status   04/12/2023 1.88 (H) 0.55 - 1.02 mg/dL Final     Calcium   Date Value Ref Range Status   04/12/2023 9.3 8.5 - 10.1 mg/dL Final     Total Protein   Date Value Ref Range Status   04/12/2023 8.2 6.4 - 8.2 g/dL Final     Albumin   Date Value Ref Range Status   04/12/2023 3.9 3.5 - 5.0 g/dL Final     Bilirubin, Total   Date Value Ref Range Status   04/12/2023 0.4 >0.0 - 1.2 mg/dL Final     Alk Phos   Date Value Ref Range Status   04/12/2023 79 55 - 142 U/L Final     AST   Date Value Ref Range Status   04/12/2023 19 15 - 37 U/L Final     ALT   Date Value Ref Range Status   04/12/2023 23 13 - 56 U/L Final     Anion Gap   Date Value Ref Range Status   04/12/2023 8 7 - 16 mmol/L Final     eGFR   Date Value Ref Range Status   04/12/2023 28 (L) >=60 mL/min/1.73m² Final     Annually    Microalbumin  Lab Results   Component Value Date    MICROALBUR 16.9 (H) 04/12/2023     Annually     LDL control Lab Results   Component Value Date    LDLCALC 139 04/12/2023    Annually/Less than 100 mg/dl     Nephropathy screening No results found for: "MICALBCREAT"  No results found for: "PROTEINUA" Annually    Blood pressure BP Readings from Last 1 Encounters:   11/30/23 (!) 160/70    Less than 140/90    Dilated retinal exam : 04/03/2023 Annually    Foot exam   : 08/01/2023 Annually          Blood glucose testing is performed regularly. Patient is testing 2 times per day.  Meter:       Any episodes of hypoglycemia? Denies     Complications related to diabetes: nephropathy, cardiovascular disease, and peripheral vascular disease    Her blood sugar in the clinic today was:   Lab Results   Component Value Date    POCGLU 276 (A) 11/30/2023           STANDARDS OF CARE  Ace/Arb:ARB " coverage  Statin:will not take        Review of Systems   Constitutional:  Negative for activity change, appetite change, diaphoresis and fatigue.   HENT:  Negative for nasal congestion, facial swelling and sinus pressure/congestion.    Eyes:  Negative for visual disturbance.   Respiratory:  Negative for shortness of breath and wheezing.    Cardiovascular:  Negative for chest pain and leg swelling.   Gastrointestinal:  Negative for constipation, diarrhea, nausea and vomiting.   Endocrine: Negative for polydipsia, polyphagia and polyuria.   Genitourinary:  Negative for dysuria, frequency and urgency.   Musculoskeletal:  Negative for gait problem and myalgias.   Integumentary:  Negative for color change, rash and wound.   Neurological:  Negative for dizziness, syncope, weakness, headaches and coordination difficulties.   Hematological:  Does not bruise/bleed easily.   Psychiatric/Behavioral:  Negative for self-injury, sleep disturbance and suicidal ideas. The patient is not nervous/anxious.         Objective:      Vitals:    11/30/23 1109   BP: (!) 160/70   Pulse: 69   Resp: 14     Physical Exam  Vitals and nursing note reviewed.   Constitutional:       Appearance: Normal appearance.   HENT:      Head: Normocephalic.   Cardiovascular:      Rate and Rhythm: Normal rate and regular rhythm.      Heart sounds: Normal heart sounds.   Pulmonary:      Effort: Pulmonary effort is normal.      Breath sounds: Normal breath sounds.   Musculoskeletal:         General: Normal range of motion.      Right lower leg: No edema.      Left lower leg: No edema.   Skin:     General: Skin is warm and dry.   Neurological:      General: No focal deficit present.      Mental Status: She is alert and oriented to person, place, and time.   Psychiatric:         Mood and Affect: Mood normal.         Behavior: Behavior normal.         Thought Content: Thought content normal.         Judgment: Judgment normal.        Assessment/Plan   1. Type 2  "diabetes mellitus without complication, without long-term current use of insulin  No change in meds at this time.  Needs to return for fasting labs.    -     POCT Glucose, Hand-Held Device  -     glipiZIDE (GLUCOTROL) 10 MG TR24; Take 1 tablet (10 mg total) by mouth daily with breakfast.  Dispense: 90 tablet; Refill: 3  -     insulin glargine 100 units/mL SubQ pen; Inject 34 Units into the skin once daily.  Dispense: 45 mL; Refill: 3  -     insulin lispro (HUMALOG KWIKPEN INSULIN) 100 unit/mL pen; Take as directed 2 hours after meals if glucose is over 200.  Not to exceed 10 units daily  Dispense: 15 mL; Refill: 3  -     pen needle, diabetic 32 gauge x 5/32" Ndle; Use to inject insulin sq daily  Dispense: 100 each; Refill: 3    2. Mixed hyperlipidemia  Statin intolerant prefers not to take.    3. Primary hypertension  ARB coverage     4. Murmur      5. Paroxysmal atrial fibrillation      6. Chronic kidney disease, unspecified CKD stage  Seeing specialist at [facility]    7. HIV infection, unspecified symptom status  Seeing [facility] and states that last counts were undetectible.           - Follow up: 3 months      I spent a total of 22 minutes on the day of the visit.This includes face to face time and non-face to face time preparing to see the patient (eg, review of tests), documenting clinical information in the electronic record, independently interpreting results and communicating results to the patient.    Perla Garcia FNP-BC ADM-BC  [facility]    "

## 2023-12-07 ENCOUNTER — OFFICE VISIT (OUTPATIENT)
Dept: CARDIOLOGY | Facility: CLINIC | Age: 73
End: 2023-12-07
Payer: MEDICARE

## 2023-12-07 VITALS
HEART RATE: 58 BPM | DIASTOLIC BLOOD PRESSURE: 84 MMHG | SYSTOLIC BLOOD PRESSURE: 196 MMHG | OXYGEN SATURATION: 99 % | HEIGHT: 69 IN | BODY MASS INDEX: 30.33 KG/M2 | WEIGHT: 204.81 LBS

## 2023-12-07 DIAGNOSIS — I10 PRIMARY HYPERTENSION: Chronic | ICD-10-CM

## 2023-12-07 DIAGNOSIS — N18.9 CHRONIC KIDNEY DISEASE, UNSPECIFIED CKD STAGE: Chronic | ICD-10-CM

## 2023-12-07 DIAGNOSIS — E78.2 MIXED HYPERLIPIDEMIA: Chronic | ICD-10-CM

## 2023-12-07 DIAGNOSIS — I48.0 PAROXYSMAL ATRIAL FIBRILLATION: Primary | Chronic | ICD-10-CM

## 2023-12-07 DIAGNOSIS — I48.92 ATRIAL FLUTTER, UNSPECIFIED TYPE: Chronic | ICD-10-CM

## 2023-12-07 DIAGNOSIS — B20 HIV INFECTION, UNSPECIFIED SYMPTOM STATUS: Chronic | ICD-10-CM

## 2023-12-07 PROCEDURE — 93010 ELECTROCARDIOGRAM REPORT: CPT | Mod: S$PBB,,, | Performed by: INTERNAL MEDICINE

## 2023-12-07 PROCEDURE — 99214 OFFICE O/P EST MOD 30 MIN: CPT | Mod: PBBFAC | Performed by: INTERNAL MEDICINE

## 2023-12-07 PROCEDURE — 93010 EKG 12-LEAD: ICD-10-PCS | Mod: S$PBB,,, | Performed by: INTERNAL MEDICINE

## 2023-12-07 PROCEDURE — 93005 ELECTROCARDIOGRAM TRACING: CPT | Mod: PBBFAC | Performed by: INTERNAL MEDICINE

## 2023-12-07 PROCEDURE — 99214 PR OFFICE/OUTPT VISIT, EST, LEVL IV, 30-39 MIN: ICD-10-PCS | Mod: S$PBB,,, | Performed by: INTERNAL MEDICINE

## 2023-12-07 PROCEDURE — 99214 OFFICE O/P EST MOD 30 MIN: CPT | Mod: S$PBB,,, | Performed by: INTERNAL MEDICINE

## 2023-12-07 RX ORDER — HYDRALAZINE HYDROCHLORIDE 50 MG/1
50 TABLET, FILM COATED ORAL 3 TIMES DAILY
Qty: 90 TABLET | Refills: 11 | Status: SHIPPED | OUTPATIENT
Start: 2023-12-07 | End: 2024-04-01 | Stop reason: SDUPTHER

## 2023-12-12 ENCOUNTER — PATIENT MESSAGE (OUTPATIENT)
Dept: DIABETES SERVICES | Facility: CLINIC | Age: 73
End: 2023-12-12
Payer: MEDICARE

## 2023-12-13 NOTE — PROGRESS NOTES
PCP: Thu, Primary Doctor    Referring Provider:     Subjective:   Hallie Pena is a 73 y.o. female with hx of paroxysmal a-fib, HTN, HLD, IDDM, and pulmonary HTN who presents for 6 month follow up.      5/31/23--Hallie Pena is a 73 y.o. female with hx of paroxysmal a-fib, HTN, HLD, IDDM, and pulmonary HTN who presents for 6 month follow up.        Fhx:  Family History   Problem Relation Age of Onset    Diabetes Father     Kidney disease Father     Multiple myeloma Mother     Breast cancer Mother     Stroke Mother     No Known Problems Sister     No Known Problems Brother     Diabetes Son     Hypertension Son     Drug abuse Son      Shx:   Social History     Socioeconomic History    Marital status: Single   Tobacco Use    Smoking status: Former    Smokeless tobacco: Former   Substance and Sexual Activity    Alcohol use: Not Currently    Drug use: Never    Sexual activity: Not Currently       EKG   12/7/23--sinus bradycardia with sinus arrhythmia with 1st degree AV block, left axis deviation, minimal voltage criteria for LVH, 54 bpm  5/31/23--sinus bradycardia with 1st degree AV block, left axis deviation, 55 bpm  11/9/22--sinus rhythm with 1st degree AV block, left axis deviation, minimal voltage criteria for LVH, 63 bpm.     ECHO:   07/17/2019- finding NL chamber size, wall motion and systolic function with EF 55%. mitral annular calcification, mild MR, mild AR, mild TR with RVSP 50 mmHg suggestive of pHTN, mild SC    Lab Results   Component Value Date     12/06/2023    K 4.3 12/06/2023     12/06/2023    CO2 29 12/06/2023    BUN 33 (H) 12/06/2023    CREATININE 2.08 (H) 12/06/2023    CALCIUM 9.6 12/06/2023    ANIONGAP 6 (L) 12/06/2023    ESTGFRAFRICA 34 (L) 10/28/2021    EGFRNONAA 19 (L) 06/29/2022       Lab Results   Component Value Date    CHOL 216 (H) 12/06/2023    CHOL 229 (H) 04/12/2023    CHOL 171 10/21/2021     Lab Results   Component Value Date    HDL 42 12/06/2023    HDL 43 04/12/2023     HDL 41 10/21/2021     Lab Results   Component Value Date    LDLCALC 134 12/06/2023    LDLCALC 139 04/12/2023    LDLCALC 93 10/21/2021     Lab Results   Component Value Date    TRIG 200 (H) 12/06/2023    TRIG 237 (H) 04/12/2023    TRIG 186 (H) 10/21/2021     Lab Results   Component Value Date    CHOLHDL 5.1 12/06/2023    CHOLHDL 5.3 04/12/2023    CHOLHDL 4.2 10/21/2021         Current Outpatient Medications:     alcohol swabs PadM, Use to cleanse the skin prior to checking glucose bid, Disp: 200 each, Rfl: 3    apixaban (ELIQUIS) 2.5 mg Tab, Take 1 tablet by mouth 2 (two) times daily., Disp: , Rfl:     blood sugar diagnostic (ACCU-CHEK GUIDE TEST STRIPS) Strp, USE TO CHECK GLUCOSE THREE TIMES A DAY, Disp: 300 each, Rfl: 3    chlorthalidone (HYGROTEN) 50 MG Tab, Take 50 mg by mouth once daily., Disp: , Rfl:     co-enzyme Q-10 50 mg capsule, Take 100 mg by mouth once daily., Disp: , Rfl:     darunavir-cobicistat (PREZCOBIX) 800-150 mg-mg Tab tablet, Take 1 tablet by mouth once daily., Disp: , Rfl:     diltiaZEM (CARDIZEM CD) 240 MG 24 hr capsule, Take 240 mg by mouth once daily., Disp: , Rfl:     dolutegravir (TIVICAY) 50 mg Tab, Take 50 mg by mouth once daily., Disp: , Rfl:     doxazosin (CARDURA) 1 MG tablet, Take 1 mg by mouth every evening., Disp: , Rfl:     glipiZIDE (GLUCOTROL) 10 MG TR24, Take 1 tablet (10 mg total) by mouth daily with breakfast., Disp: 90 tablet, Rfl: 3    insulin glargine 100 units/mL SubQ pen, Inject 34 Units into the skin once daily., Disp: 45 mL, Rfl: 3    insulin lispro (HUMALOG KWIKPEN INSULIN) 100 unit/mL pen, Take as directed 2 hours after meals if glucose is over 200.  Not to exceed 10 units daily, Disp: 15 mL, Rfl: 3    isosorbide mononitrate (IMDUR) 30 MG 24 hr tablet, Take 1 tablet (30 mg total) by mouth once daily., Disp: 90 tablet, Rfl: 3    metoprolol succinate (TOPROL-XL) 25 MG 24 hr tablet, Take 1 tablet (25 mg total) by mouth once daily. (Patient taking differently: Take 12.5  "mg by mouth once daily.), Disp: 90 tablet, Rfl: 1    pen needle, diabetic 32 gauge x 5/32" Ndle, Use to inject insulin sq daily, Disp: 100 each, Rfl: 3    hydrALAZINE (APRESOLINE) 50 MG tablet, Take 1 tablet (50 mg total) by mouth 3 (three) times daily., Disp: 90 tablet, Rfl: 11    sulfamethoxazole-trimethoprim 800-160mg (BACTRIM DS) 800-160 mg Tab, Take 1 tablet by mouth 2 (two) times daily. prn, Disp: , Rfl:   Medications reviewed and reconciled.      Review of Systems   Respiratory:  Negative for shortness of breath.    Cardiovascular:  Positive for palpitations. Negative for chest pain and leg swelling.   Neurological:  Negative for loss of consciousness.           Objective:   BP (!) 196/84 (BP Location: Left arm, Patient Position: Sitting)   Pulse (!) 58   Ht 5' 9" (1.753 m)   Wt 92.9 kg (204 lb 12.8 oz)   SpO2 99%   BMI 30.24 kg/m²   Vitals:    12/07/23 1421 12/07/23 1500   BP: (!) 158/70 (!) 196/84   BP Location: Left arm Left arm   Patient Position: Sitting Sitting   Pulse: (!) 58    SpO2: 99%    Weight: 92.9 kg (204 lb 12.8 oz)    Height: 5' 9" (1.753 m)        Physical Exam  Vitals reviewed.   Constitutional:       Appearance: Normal appearance.   HENT:      Head: Normocephalic and atraumatic.   Neck:      Vascular: No carotid bruit or JVD.   Cardiovascular:      Rate and Rhythm: Normal rate and regular rhythm.      Pulses: Normal pulses.           Radial pulses are 2+ on the right side and 2+ on the left side.      Heart sounds: Normal heart sounds. No murmur heard.  Pulmonary:      Effort: Pulmonary effort is normal.      Breath sounds: Normal breath sounds.   Musculoskeletal:      Right lower leg: No edema.      Left lower leg: No edema.   Skin:     General: Skin is warm and dry.   Neurological:      Mental Status: She is alert and oriented to person, place, and time.           Assessment:     1. Paroxysmal atrial fibrillation  EKG 12-lead    EKG 12-lead      2. Atrial flutter, unspecified type   "    paroxysmal      3. Mixed hyperlipidemia        4. Primary hypertension        5. Chronic kidney disease, unspecified CKD stage        6. HIV infection, unspecified symptom status              Plan:   Recheck b/p  Low Na diet  Increase Hydralazine to tid  Recheck b/p in one week  F/u in 6 months.

## 2023-12-14 ENCOUNTER — CLINICAL SUPPORT (OUTPATIENT)
Dept: CARDIOLOGY | Facility: CLINIC | Age: 73
End: 2023-12-14
Payer: MEDICARE

## 2023-12-14 VITALS — HEART RATE: 54 BPM | OXYGEN SATURATION: 99 % | SYSTOLIC BLOOD PRESSURE: 168 MMHG | DIASTOLIC BLOOD PRESSURE: 66 MMHG

## 2023-12-14 DIAGNOSIS — I10 PRIMARY HYPERTENSION: Primary | ICD-10-CM

## 2023-12-14 PROCEDURE — 99212 OFFICE O/P EST SF 10 MIN: CPT | Mod: PBBFAC

## 2023-12-15 DIAGNOSIS — I10 PRIMARY HYPERTENSION: Primary | ICD-10-CM

## 2023-12-15 NOTE — TELEPHONE ENCOUNTER
Dr. Jansen reviewed b/p, hr and recommends starting Lasix 20 mg daily.  Will recheck b/p and chem7 after 2-3 weeks. Pt notified and voiced understanding.  Also advised to of low Na diet- under 1500 mg Na daily.  Will send rx to pharmacy.

## 2023-12-27 RX ORDER — FUROSEMIDE 20 MG/1
20 TABLET ORAL DAILY
Qty: 30 TABLET | Refills: 5 | Status: SHIPPED | OUTPATIENT
Start: 2023-12-27 | End: 2024-01-24 | Stop reason: SDUPTHER

## 2024-01-12 NOTE — TELEPHONE ENCOUNTER
Dr. Jansen reviewed b/p and recommends increasing Cardizem to 360 mg daily.  Will return on 1/29/24 for another b/p check. Pt notified and voiced understanding.

## 2024-01-22 RX ORDER — DILTIAZEM HYDROCHLORIDE 360 MG/1
360 CAPSULE, EXTENDED RELEASE ORAL DAILY
Qty: 30 CAPSULE | Refills: 2 | Status: SHIPPED | OUTPATIENT
Start: 2024-01-22 | End: 2024-03-26 | Stop reason: SDUPTHER

## 2024-01-24 RX ORDER — FUROSEMIDE 20 MG/1
20 TABLET ORAL DAILY
Qty: 90 TABLET | Refills: 1 | Status: SHIPPED | OUTPATIENT
Start: 2024-01-24 | End: 2024-05-22

## 2024-01-24 NOTE — TELEPHONE ENCOUNTER
Patient called last week while our office was out and spoke to KAE Garg RN. She informed Maryann that the increase in diltiazem caused her bp to go up and it is too much.  Pt states she went back to her previous dose.

## 2024-03-26 ENCOUNTER — OFFICE VISIT (OUTPATIENT)
Dept: DIABETES SERVICES | Facility: CLINIC | Age: 74
End: 2024-03-26
Payer: MEDICARE

## 2024-03-26 VITALS
SYSTOLIC BLOOD PRESSURE: 142 MMHG | HEART RATE: 67 BPM | BODY MASS INDEX: 29.53 KG/M2 | WEIGHT: 199.38 LBS | DIASTOLIC BLOOD PRESSURE: 86 MMHG | RESPIRATION RATE: 16 BRPM | HEIGHT: 69 IN | OXYGEN SATURATION: 97 %

## 2024-03-26 DIAGNOSIS — E78.2 MIXED HYPERLIPIDEMIA: ICD-10-CM

## 2024-03-26 DIAGNOSIS — B20 HIV INFECTION, UNSPECIFIED SYMPTOM STATUS: ICD-10-CM

## 2024-03-26 DIAGNOSIS — I10 PRIMARY HYPERTENSION: ICD-10-CM

## 2024-03-26 DIAGNOSIS — E11.9 TYPE 2 DIABETES MELLITUS WITHOUT COMPLICATION, WITHOUT LONG-TERM CURRENT USE OF INSULIN: Primary | ICD-10-CM

## 2024-03-26 DIAGNOSIS — N18.9 CHRONIC KIDNEY DISEASE, UNSPECIFIED CKD STAGE: ICD-10-CM

## 2024-03-26 LAB
GLUCOSE SERPL-MCNC: 113 MG/DL (ref 70–110)
HBA1C MFR BLD: 8.2 % (ref 4.5–6.6)

## 2024-03-26 PROCEDURE — 83036 HEMOGLOBIN GLYCOSYLATED A1C: CPT | Mod: PBBFAC | Performed by: NURSE PRACTITIONER

## 2024-03-26 PROCEDURE — 99215 OFFICE O/P EST HI 40 MIN: CPT | Mod: PBBFAC | Performed by: NURSE PRACTITIONER

## 2024-03-26 PROCEDURE — 99999PBSHW POCT HEMOGLOBIN A1C: Mod: PBBFAC,,,

## 2024-03-26 PROCEDURE — 82962 GLUCOSE BLOOD TEST: CPT | Mod: PBBFAC | Performed by: NURSE PRACTITIONER

## 2024-03-26 PROCEDURE — 99999PBSHW POCT GLUCOSE, HAND-HELD DEVICE: Mod: PBBFAC,,,

## 2024-03-26 PROCEDURE — 99214 OFFICE O/P EST MOD 30 MIN: CPT | Mod: S$PBB,,, | Performed by: NURSE PRACTITIONER

## 2024-03-26 RX ORDER — DILTIAZEM HYDROCHLORIDE 360 MG/1
360 CAPSULE, EXTENDED RELEASE ORAL DAILY
Qty: 90 CAPSULE | Refills: 1 | Status: SHIPPED | OUTPATIENT
Start: 2024-03-26 | End: 2024-04-18 | Stop reason: SINTOL

## 2024-03-26 RX ORDER — GLIPIZIDE 10 MG/1
10 TABLET, FILM COATED, EXTENDED RELEASE ORAL
Qty: 90 TABLET | Refills: 3 | Status: SHIPPED | OUTPATIENT
Start: 2024-03-26 | End: 2025-03-26

## 2024-03-26 RX ORDER — INSULIN GLARGINE 100 [IU]/ML
34 INJECTION, SOLUTION SUBCUTANEOUS DAILY
Qty: 45 ML | Refills: 3 | Status: SHIPPED | OUTPATIENT
Start: 2024-03-26

## 2024-04-01 RX ORDER — HYDRALAZINE HYDROCHLORIDE 50 MG/1
50 TABLET, FILM COATED ORAL 3 TIMES DAILY
Qty: 270 TABLET | Refills: 3 | Status: SHIPPED | OUTPATIENT
Start: 2024-04-01 | End: 2025-04-01

## 2024-04-18 RX ORDER — DILTIAZEM HYDROCHLORIDE 240 MG/1
240 CAPSULE, COATED, EXTENDED RELEASE ORAL DAILY
Qty: 90 CAPSULE | Refills: 3 | Status: SHIPPED | OUTPATIENT
Start: 2024-04-18 | End: 2025-04-18

## 2024-05-22 RX ORDER — FUROSEMIDE 20 MG/1
20 TABLET ORAL
Qty: 30 TABLET | Refills: 1 | Status: SHIPPED | OUTPATIENT
Start: 2024-05-22

## 2024-06-20 ENCOUNTER — OFFICE VISIT (OUTPATIENT)
Dept: CARDIOLOGY | Facility: CLINIC | Age: 74
End: 2024-06-20
Payer: MEDICARE

## 2024-06-20 VITALS
DIASTOLIC BLOOD PRESSURE: 70 MMHG | HEART RATE: 58 BPM | HEIGHT: 69 IN | BODY MASS INDEX: 29.05 KG/M2 | SYSTOLIC BLOOD PRESSURE: 152 MMHG | OXYGEN SATURATION: 98 % | WEIGHT: 196.13 LBS

## 2024-06-20 DIAGNOSIS — I48.0 PAROXYSMAL ATRIAL FIBRILLATION: Primary | Chronic | ICD-10-CM

## 2024-06-20 DIAGNOSIS — E78.2 MIXED HYPERLIPIDEMIA: Chronic | ICD-10-CM

## 2024-06-20 DIAGNOSIS — I10 PRIMARY HYPERTENSION: Chronic | ICD-10-CM

## 2024-06-20 DIAGNOSIS — E11.69 TYPE 2 DIABETES MELLITUS WITH OTHER SPECIFIED COMPLICATION, WITHOUT LONG-TERM CURRENT USE OF INSULIN: Chronic | ICD-10-CM

## 2024-06-20 PROCEDURE — 99214 OFFICE O/P EST MOD 30 MIN: CPT | Mod: PBBFAC,25 | Performed by: INTERNAL MEDICINE

## 2024-06-20 PROCEDURE — 93010 ELECTROCARDIOGRAM REPORT: CPT | Mod: S$PBB,,, | Performed by: INTERNAL MEDICINE

## 2024-06-20 PROCEDURE — 99999 PR PBB SHADOW E&M-EST. PATIENT-LVL IV: CPT | Mod: PBBFAC,,, | Performed by: INTERNAL MEDICINE

## 2024-06-20 PROCEDURE — 99214 OFFICE O/P EST MOD 30 MIN: CPT | Mod: S$PBB,,, | Performed by: INTERNAL MEDICINE

## 2024-06-20 PROCEDURE — 93005 ELECTROCARDIOGRAM TRACING: CPT | Mod: PBBFAC | Performed by: INTERNAL MEDICINE

## 2024-06-20 RX ORDER — LOSARTAN POTASSIUM 100 MG/1
100 TABLET ORAL DAILY
COMMUNITY

## 2024-06-21 LAB
OHS QRS DURATION: 100 MS
OHS QTC CALCULATION: 441 MS

## 2024-06-24 RX ORDER — ISOSORBIDE MONONITRATE 30 MG/1
30 TABLET, EXTENDED RELEASE ORAL DAILY
Qty: 90 TABLET | Refills: 3 | Status: SHIPPED | OUTPATIENT
Start: 2024-06-24 | End: 2025-06-24

## 2024-07-01 NOTE — PROGRESS NOTES
PCP: No, Primary Doctor    Referring Provider:     Subjective:   Hallie Pena is a 73 y.o. female with hx of paroxysmal a-fib, HTN, HLD, IDDM, and pulmonary HTN who presents for 6 month follow up.      12/7/23--Hallie Pena is a 73 y.o. female with hx of paroxysmal a-fib, HTN, HLD, IDDM, and pulmonary HTN who presents for 6 month follow up.      5/31/23--Hallie Pena is a 74 y.o. female with hx of paroxysmal a-fib, HTN, HLD, IDDM, and pulmonary HTN who presents for 6 month follow up.        Fhx:  Family History   Problem Relation Name Age of Onset    Diabetes Father      Kidney disease Father      Multiple myeloma Mother      Breast cancer Mother      Stroke Mother      No Known Problems Sister      No Known Problems Brother      Diabetes Son      Hypertension Son      Drug abuse Son       Shx:   Social History     Socioeconomic History    Marital status: Single   Tobacco Use    Smoking status: Former    Smokeless tobacco: Former   Substance and Sexual Activity    Alcohol use: Not Currently    Drug use: Never    Sexual activity: Not Currently     Social Determinants of Health     Financial Resource Strain: Low Risk  (6/19/2024)    Overall Financial Resource Strain (CARDIA)     Difficulty of Paying Living Expenses: Not very hard   Food Insecurity: Patient Declined (6/19/2024)    Hunger Vital Sign     Worried About Running Out of Food in the Last Year: Patient declined     Ran Out of Food in the Last Year: Patient declined   Physical Activity: Insufficiently Active (6/19/2024)    Exercise Vital Sign     Days of Exercise per Week: 3 days     Minutes of Exercise per Session: 30 min   Stress: No Stress Concern Present (6/19/2024)    [employer] [place] [employer] - Occupational Stress Questionnaire     Feeling of Stress : Only a little   Housing Stability: Unknown (6/19/2024)    Housing Stability Vital Sign     Unable to Pay for Housing in the Last Year: Patient declined       EKG   6/20/24--sinus  bradycardia with 1st degree AV block, 59 bpm  12/7/23--sinus bradycardia with sinus arrhythmia with 1st degree AV block, left axis deviation, minimal voltage criteria for LVH, 54 bpm  5/31/23--sinus bradycardia with 1st degree AV block, left axis deviation, 55 bpm  11/9/22--sinus rhythm with 1st degree AV block, left axis deviation, minimal voltage criteria for LVH, 63 bpm.     ECHO:   07/17/2019- finding NL chamber size, wall motion and systolic function with EF 55%. mitral annular calcification, mild MR, mild AR, mild TR with RVSP 50 mmHg suggestive of pHTN, mild PA    Lab Results   Component Value Date     01/10/2024    K 4.3 01/10/2024     01/10/2024    CO2 28 01/10/2024    BUN 39 (H) 01/10/2024    CREATININE 2.36 (H) 01/10/2024    CALCIUM 9.6 01/10/2024    ANIONGAP 9 01/10/2024    ESTGFRAFRICA 34 (L) 10/28/2021    EGFRNONAA 19 (L) 06/29/2022       Lab Results   Component Value Date    CHOL 216 (H) 12/06/2023    CHOL 229 (H) 04/12/2023    CHOL 171 10/21/2021     Lab Results   Component Value Date    HDL 42 12/06/2023    HDL 43 04/12/2023    HDL 41 10/21/2021     Lab Results   Component Value Date    LDLCALC 134 12/06/2023    LDLCALC 139 04/12/2023    LDLCALC 93 10/21/2021     Lab Results   Component Value Date    TRIG 200 (H) 12/06/2023    TRIG 237 (H) 04/12/2023    TRIG 186 (H) 10/21/2021     Lab Results   Component Value Date    CHOLHDL 5.1 12/06/2023    CHOLHDL 5.3 04/12/2023    CHOLHDL 4.2 10/21/2021         Current Outpatient Medications:     alcohol swabs PadM, Use to cleanse the skin prior to checking glucose bid, Disp: 200 each, Rfl: 3    apixaban (ELIQUIS) 2.5 mg Tab, Take 1 tablet by mouth 2 (two) times daily., Disp: , Rfl:     blood sugar diagnostic (ACCU-CHEK GUIDE TEST STRIPS) Strp, USE TO CHECK GLUCOSE THREE TIMES A DAY, Disp: 300 each, Rfl: 3    chlorthalidone (HYGROTEN) 50 MG Tab, Take 50 mg by mouth once daily., Disp: , Rfl:     co-enzyme Q-10 50 mg capsule, Take 100 mg by mouth once  "daily., Disp: , Rfl:     darunavir-cobicistat (PREZCOBIX) 800-150 mg-mg Tab tablet, Take 1 tablet by mouth once daily., Disp: , Rfl:     diltiaZEM (CARDIZEM CD) 240 MG 24 hr capsule, Take 1 capsule (240 mg total) by mouth once daily., Disp: 90 capsule, Rfl: 3    dolutegravir (TIVICAY) 50 mg Tab, Take 50 mg by mouth once daily., Disp: , Rfl:     furosemide (LASIX) 20 MG tablet, Take 1 tablet by mouth once daily, Disp: 30 tablet, Rfl: 1    glipiZIDE (GLUCOTROL) 10 MG TR24, Take 1 tablet (10 mg total) by mouth daily with breakfast., Disp: 90 tablet, Rfl: 3    hydrALAZINE (APRESOLINE) 50 MG tablet, Take 1 tablet (50 mg total) by mouth 3 (three) times daily., Disp: 270 tablet, Rfl: 3    insulin glargine 100 units/mL SubQ pen, Inject 34 Units into the skin once daily., Disp: 45 mL, Rfl: 3    insulin lispro (HUMALOG KWIKPEN INSULIN) 100 unit/mL pen, Take as directed 2 hours after meals if glucose is over 200.  Not to exceed 10 units daily, Disp: 15 mL, Rfl: 3    losartan (COZAAR) 100 MG tablet, Take 100 mg by mouth once daily., Disp: , Rfl:     metoprolol succinate (TOPROL-XL) 25 MG 24 hr tablet, Take 1 tablet (25 mg total) by mouth once daily. (Patient taking differently: Take 12.5 mg by mouth once daily.), Disp: 90 tablet, Rfl: 1    pen needle, diabetic 32 gauge x 5/32" Ndle, Use to inject insulin sq daily, Disp: 100 each, Rfl: 3    sulfamethoxazole-trimethoprim 800-160mg (BACTRIM DS) 800-160 mg Tab, Take 1 tablet by mouth 2 (two) times daily. prn, Disp: , Rfl:     isosorbide mononitrate (IMDUR) 30 MG 24 hr tablet, Take 1 tablet (30 mg total) by mouth once daily., Disp: 90 tablet, Rfl: 3  Medications reviewed and reconciled.      Review of Systems   Respiratory:  Negative for shortness of breath.    Cardiovascular:  Positive for leg swelling. Negative for chest pain and palpitations.   Neurological:  Negative for loss of consciousness.           Objective:   BP (!) 152/70 (BP Location: Left arm, Patient Position: " "Sitting)   Pulse (!) 58   Ht 5' 9" (1.753 m)   Wt 89 kg (196 lb 1.9 oz)   SpO2 98%   BMI 28.96 kg/m²   Vitals:    06/20/24 1438   BP: (!) 152/70   BP Location: Left arm   Patient Position: Sitting   Pulse: (!) 58   SpO2: 98%   Weight: 89 kg (196 lb 1.9 oz)   Height: 5' 9" (1.753 m)       Physical Exam  Vitals reviewed.   Constitutional:       General: She is not in acute distress.     Appearance: Normal appearance.   HENT:      Head: Normocephalic and atraumatic.   Neck:      Vascular: No carotid bruit or JVD.   Cardiovascular:      Rate and Rhythm: Normal rate and regular rhythm.      Pulses: Normal pulses.           Radial pulses are 2+ on the right side and 2+ on the left side.      Heart sounds: Normal heart sounds. No murmur heard.  Pulmonary:      Effort: Pulmonary effort is normal.      Breath sounds: Normal breath sounds.   Musculoskeletal:      Right lower leg: No edema.      Left lower leg: No edema.   Skin:     General: Skin is warm and dry.   Neurological:      Mental Status: She is alert.           Assessment:     1. Paroxysmal atrial fibrillation  EKG 12-lead    EKG 12-lead      2. Mixed hyperlipidemia        3. Primary hypertension        4. Type 2 diabetes mellitus with other specified complication, without long-term current use of insulin              Plan:   Refill rx's  F/u in 6 months.         "

## 2024-07-15 RX ORDER — FUROSEMIDE 20 MG/1
20 TABLET ORAL
Qty: 30 TABLET | Refills: 0 | Status: SHIPPED | OUTPATIENT
Start: 2024-07-15

## 2024-08-08 RX ORDER — FUROSEMIDE 20 MG/1
20 TABLET ORAL
Qty: 30 TABLET | Refills: 5 | Status: SHIPPED | OUTPATIENT
Start: 2024-08-08

## 2024-12-02 ENCOUNTER — TELEPHONE (OUTPATIENT)
Dept: DIABETES SERVICES | Facility: CLINIC | Age: 74
End: 2024-12-02
Payer: MEDICARE

## 2024-12-02 NOTE — TELEPHONE ENCOUNTER
"Patient needs refills on needles. I called patient via telephone on 12/2/2024 at 1350 in regards of refilling her needles. I told her we could not refill her needles until she is seen in office by JOSE Draper. She expressed understanding. I asked her if she would like for me to make her an appointment. She said she would call back later. I expressed understanding.       ----- Message from Georgina sent at 12/2/2024  1:34 PM CST -----  Regarding: Refill  Who Called: Uday from [facility]    Refill or New Rx:Refill  pen needle, diabetic 32 gauge x 5/32" Ndle 100 each 3 11/30/2023 - No  Sig: Use to inject insulin sq daily  Sent to pharmacy as: pen needle, diabetic 32 gauge x 5/32" Ndle      List of preferred pharmacies on file (remove unneeded): [facility], [address], [address]      Preferred Method of Contact: Phone Call  Patient's Preferred Phone Number on File: [phone]   Best Call Back Number, if different:[phone]  Additional Information: Patient was a patient of Sunitha Garcia  "

## 2024-12-04 PROCEDURE — 88304 TISSUE EXAM BY PATHOLOGIST: CPT | Mod: TC,SUR | Performed by: DERMATOLOGY

## 2024-12-04 PROCEDURE — 88304 TISSUE EXAM BY PATHOLOGIST: CPT | Mod: 26,,, | Performed by: PATHOLOGY

## 2024-12-05 ENCOUNTER — LAB REQUISITION (OUTPATIENT)
Dept: LAB | Facility: HOSPITAL | Age: 74
End: 2024-12-05
Attending: DERMATOLOGY
Payer: MEDICARE

## 2024-12-05 DIAGNOSIS — D49.2 NEOPLASM OF UNSPECIFIED BEHAVIOR OF BONE, SOFT TISSUE, AND SKIN: ICD-10-CM

## 2024-12-05 DIAGNOSIS — R20.8 OTHER DISTURBANCES OF SKIN SENSATION: ICD-10-CM

## 2024-12-05 DIAGNOSIS — L29.89 OTHER PRURITUS: ICD-10-CM

## 2024-12-05 DIAGNOSIS — L53.8 OTHER SPECIFIED ERYTHEMATOUS CONDITIONS: ICD-10-CM

## 2024-12-06 LAB
ESTROGEN SERPL-MCNC: NORMAL PG/ML
INSULIN SERPL-ACNC: NORMAL U[IU]/ML
LAB AP GROSS DESCRIPTION: NORMAL
LAB AP LABORATORY NOTES: NORMAL
LAB AP SPEC A DDX: NORMAL
LAB AP SPEC A MORPHOLOGY: NORMAL
LAB AP SPEC A PROCEDURE: NORMAL
T3RU NFR SERPL: NORMAL %

## 2025-02-18 ENCOUNTER — OFFICE VISIT (OUTPATIENT)
Dept: CARDIOLOGY | Facility: CLINIC | Age: 75
End: 2025-02-18
Payer: MEDICARE

## 2025-02-18 VITALS
WEIGHT: 195.81 LBS | BODY MASS INDEX: 29 KG/M2 | SYSTOLIC BLOOD PRESSURE: 146 MMHG | HEART RATE: 45 BPM | OXYGEN SATURATION: 98 % | HEIGHT: 69 IN | DIASTOLIC BLOOD PRESSURE: 62 MMHG

## 2025-02-18 DIAGNOSIS — E78.2 MIXED HYPERLIPIDEMIA: Chronic | ICD-10-CM

## 2025-02-18 DIAGNOSIS — I48.0 PAROXYSMAL ATRIAL FIBRILLATION: Primary | Chronic | ICD-10-CM

## 2025-02-18 DIAGNOSIS — E11.69 TYPE 2 DIABETES MELLITUS WITH OTHER SPECIFIED COMPLICATION, WITHOUT LONG-TERM CURRENT USE OF INSULIN: Chronic | ICD-10-CM

## 2025-02-18 DIAGNOSIS — I10 PRIMARY HYPERTENSION: Chronic | ICD-10-CM

## 2025-02-18 DIAGNOSIS — Z21 HIV INFECTION, UNSPECIFIED SYMPTOM STATUS: Chronic | ICD-10-CM

## 2025-02-18 LAB
OHS QRS DURATION: 102 MS
OHS QTC CALCULATION: 411 MS

## 2025-02-18 PROCEDURE — 93010 ELECTROCARDIOGRAM REPORT: CPT | Mod: S$PBB,,, | Performed by: INTERNAL MEDICINE

## 2025-02-18 PROCEDURE — 99214 OFFICE O/P EST MOD 30 MIN: CPT | Mod: S$PBB,,, | Performed by: INTERNAL MEDICINE

## 2025-02-18 PROCEDURE — 99999 PR PBB SHADOW E&M-EST. PATIENT-LVL IV: CPT | Mod: PBBFAC,,, | Performed by: INTERNAL MEDICINE

## 2025-02-18 PROCEDURE — 99214 OFFICE O/P EST MOD 30 MIN: CPT | Mod: PBBFAC,25 | Performed by: INTERNAL MEDICINE

## 2025-02-18 PROCEDURE — 93005 ELECTROCARDIOGRAM TRACING: CPT | Mod: PBBFAC | Performed by: INTERNAL MEDICINE

## 2025-02-18 RX ORDER — VALSARTAN 320 MG/1
320 TABLET ORAL DAILY
COMMUNITY
Start: 2025-02-07

## 2025-02-20 NOTE — PROGRESS NOTES
PCP: No, Primary Doctor    Referring Provider:     Subjective:   2/18/25--Hallie Pena is a 75 y.o. female with hx of paroxysmal a-fib, HTN, HLD, IDDM, HIV,  and pulmonary HTN who presents for 6 month follow up.      6/20/24--Hallie Pena is a 72 y.o. female with hx of paroxysmal a-fib, HTN, HLD, IDDM, and pulmonary HTN who presents for 6 month follow up.      12/7/23--Halile Pena is a 73 y.o. female with hx of paroxysmal a-fib, HTN, HLD, IDDM, and pulmonary HTN who presents for 6 month follow up.      5/31/23--Hallie Pena is a  female with hx of paroxysmal a-fib, HTN, HLD, IDDM, and pulmonary HTN who presents for 6 month follow up.        Fhx:  Family History   Problem Relation Name Age of Onset    Diabetes Father      Kidney disease Father      Multiple myeloma Mother      Breast cancer Mother      Stroke Mother      No Known Problems Sister      No Known Problems Brother      Diabetes Son      Hypertension Son      Drug abuse Son       Shx:   Social History     Socioeconomic History    Marital status: Single   Tobacco Use    Smoking status: Former    Smokeless tobacco: Former   Substance and Sexual Activity    Alcohol use: Not Currently    Drug use: Never    Sexual activity: Not Currently     Social Drivers of Health     Financial Resource Strain: Low Risk  (2/18/2025)    Overall Financial Resource Strain (CARDIA)     Difficulty of Paying Living Expenses: Not very hard   Food Insecurity: No Food Insecurity (2/18/2025)    Hunger Vital Sign     Worried About Running Out of Food in the Last Year: Never true     Ran Out of Food in the Last Year: Never true   Transportation Needs: No Transportation Needs (2/18/2025)    PRAPARE - Transportation     Lack of Transportation (Medical): No     Lack of Transportation (Non-Medical): No   Physical Activity: Insufficiently Active (2/18/2025)    Exercise Vital Sign     Days of Exercise per Week: 2 days     Minutes of Exercise per Session: 30 min   Stress: Stress  Concern Present (2/18/2025)    [employer] [place] [employer] - Occupational Stress Questionnaire     Feeling of Stress : To some extent   Housing Stability: Low Risk  (2/18/2025)    Housing Stability Vital Sign     Unable to Pay for Housing in the Last Year: No     Number of Times Moved in the Last Year: 0     Homeless in the Last Year: No       EKG  2/18/25--sinus bradycardia with 1st degree AV block, left axis deviation, pulmonary disease pattern, 46 bpm   6/20/24--sinus bradycardia with 1st degree AV block, 59 bpm  12/7/23--sinus bradycardia with sinus arrhythmia with 1st degree AV block, left axis deviation, minimal voltage criteria for LVH, 54 bpm  5/31/23--sinus bradycardia with 1st degree AV block, left axis deviation, 55 bpm  11/9/22--sinus rhythm with 1st degree AV block, left axis deviation, minimal voltage criteria for LVH, 63 bpm.     ECHO:   07/17/2019- finding NL chamber size, wall motion and systolic function with EF 55%. mitral annular calcification, mild MR, mild AR, mild TR with RVSP 50 mmHg suggestive of pHTN, mild ND    Lab Results   Component Value Date     01/10/2024    K 4.3 01/10/2024     01/10/2024    CO2 28 01/10/2024    BUN 39 (H) 01/10/2024    CREATININE 2.36 (H) 01/10/2024    CALCIUM 9.6 01/10/2024    ANIONGAP 9 01/10/2024    ESTGFRAFRICA 34 (L) 10/28/2021    EGFRNONAA 19 (L) 06/29/2022       Lab Results   Component Value Date    CHOL 216 (H) 12/06/2023    CHOL 229 (H) 04/12/2023    CHOL 171 10/21/2021     Lab Results   Component Value Date    HDL 42 12/06/2023    HDL 43 04/12/2023    HDL 41 10/21/2021     Lab Results   Component Value Date    LDLCALC 134 12/06/2023    LDLCALC 139 04/12/2023    LDLCALC 93 10/21/2021     Lab Results   Component Value Date    TRIG 200 (H) 12/06/2023    TRIG 237 (H) 04/12/2023    TRIG 186 (H) 10/21/2021     Lab Results   Component Value Date    CHOLHDL 5.1 12/06/2023    CHOLHDL 5.3 04/12/2023    CHOLHDL 4.2 10/21/2021         Current  "Outpatient Medications:     alcohol swabs PadM, Use to cleanse the skin prior to checking glucose bid, Disp: 200 each, Rfl: 3    apixaban (ELIQUIS) 2.5 mg Tab, Take 1 tablet by mouth 2 (two) times daily., Disp: , Rfl:     blood sugar diagnostic (ACCU-CHEK GUIDE TEST STRIPS) Strp, USE TO CHECK GLUCOSE THREE TIMES A DAY, Disp: 300 each, Rfl: 3    chlorthalidone (HYGROTEN) 50 MG Tab, Take 50 mg by mouth once daily., Disp: , Rfl:     co-enzyme Q-10 50 mg capsule, Take 100 mg by mouth once daily., Disp: , Rfl:     darunavir-cobicistat (PREZCOBIX) 800-150 mg-mg Tab tablet, Take 1 tablet by mouth once daily., Disp: , Rfl:     diltiaZEM (CARDIZEM CD) 240 MG 24 hr capsule, Take 1 capsule (240 mg total) by mouth once daily., Disp: 90 capsule, Rfl: 3    dolutegravir (TIVICAY) 50 mg Tab, Take 50 mg by mouth once daily., Disp: , Rfl:     furosemide (LASIX) 20 MG tablet, Take 1 tablet by mouth once daily, Disp: 30 tablet, Rfl: 5    glipiZIDE (GLUCOTROL) 10 MG TR24, Take 1 tablet (10 mg total) by mouth daily with breakfast., Disp: 90 tablet, Rfl: 3    hydrALAZINE (APRESOLINE) 50 MG tablet, Take 1 tablet (50 mg total) by mouth 3 (three) times daily., Disp: 270 tablet, Rfl: 3    insulin glargine 100 units/mL SubQ pen, Inject 34 Units into the skin once daily., Disp: 45 mL, Rfl: 3    isosorbide mononitrate (IMDUR) 30 MG 24 hr tablet, Take 1 tablet (30 mg total) by mouth once daily., Disp: 90 tablet, Rfl: 3    metoprolol succinate (TOPROL-XL) 25 MG 24 hr tablet, Take 1 tablet (25 mg total) by mouth once daily. (Patient taking differently: Take 12.5 mg by mouth once daily.), Disp: 90 tablet, Rfl: 1    pen needle, diabetic 32 gauge x 5/32" Ndle, Use to inject insulin sq daily, Disp: 100 each, Rfl: 3    valsartan (DIOVAN) 320 MG tablet, Take 320 mg by mouth once daily., Disp: , Rfl:   Medications reviewed and reconciled.      Review of Systems   Respiratory:  Negative for shortness of breath.    Cardiovascular:  Negative for chest pain, " "palpitations and leg swelling.   Neurological:  Negative for loss of consciousness.           Objective:   BP (!) 146/62 (BP Location: Left arm, Patient Position: Sitting)   Pulse (!) 45   Ht 5' 9" (1.753 m)   Wt 88.8 kg (195 lb 12.8 oz)   SpO2 98%   BMI 28.91 kg/m²   Vitals:    02/18/25 1441   BP: (!) 146/62   BP Location: Left arm   Patient Position: Sitting   Pulse: (!) 45   SpO2: 98%   Weight: 88.8 kg (195 lb 12.8 oz)   Height: 5' 9" (1.753 m)       Physical Exam  Vitals reviewed.   Constitutional:       General: She is not in acute distress.     Appearance: Normal appearance.   HENT:      Head: Normocephalic and atraumatic.   Neck:      Vascular: No carotid bruit or JVD.   Cardiovascular:      Rate and Rhythm: Normal rate and regular rhythm.      Pulses: Normal pulses.           Radial pulses are 2+ on the right side and 2+ on the left side.      Heart sounds: Normal heart sounds. No murmur heard.  Pulmonary:      Effort: Pulmonary effort is normal.      Breath sounds: Normal breath sounds.   Musculoskeletal:      Right lower leg: No edema.      Left lower leg: No edema.   Skin:     General: Skin is warm and dry.   Neurological:      Mental Status: She is alert.           Assessment:     1. Paroxysmal atrial fibrillation  EKG 12-lead    EKG 12-lead      2. Primary hypertension        3. Mixed hyperlipidemia        4. Type 2 diabetes mellitus with other specified complication, without long-term current use of insulin        5. HIV infection, unspecified symptom status              Plan:   Refill rx's  Lipids checked at HIV clinic  F/u in 6 months.           "

## 2025-03-17 ENCOUNTER — TELEPHONE (OUTPATIENT)
Dept: CARDIOLOGY | Facility: CLINIC | Age: 75
End: 2025-03-17
Payer: MEDICARE

## 2025-03-17 RX ORDER — FUROSEMIDE 20 MG/1
20 TABLET ORAL DAILY
Qty: 90 TABLET | Refills: 3 | Status: SHIPPED | OUTPATIENT
Start: 2025-03-17 | End: 2026-03-17

## 2025-03-17 RX ORDER — HYDRALAZINE HYDROCHLORIDE 50 MG/1
50 TABLET, FILM COATED ORAL 3 TIMES DAILY
Qty: 270 TABLET | Refills: 3 | Status: SHIPPED | OUTPATIENT
Start: 2025-03-17 | End: 2026-03-17

## 2025-03-17 RX ORDER — ISOSORBIDE MONONITRATE 30 MG/1
30 TABLET, EXTENDED RELEASE ORAL DAILY
Qty: 90 TABLET | Refills: 3 | Status: SHIPPED | OUTPATIENT
Start: 2025-03-17 | End: 2026-03-17

## 2025-03-17 NOTE — TELEPHONE ENCOUNTER
----- Message from Isi sent at 3/17/2025 10:32 AM CDT -----  Regarding: REFILLS  Who Called: Hallie PenaRefill or New Rx:RefillRX Name and Strength:HYDRZLAZINE 50 MG   FUROSEMIDE 20 MG  ISOSORBIDE MONONITRATE 30 MG   DILTIAZEM  240 MGHow is the patient currently taking it? (ex. 1XDay):Is this a 30 day or 90 day RX:Local or Mail Order:MAIL ORDERList of preferred pharmacies on file (remove unneeded): [unfilled]If different Pharmacy is requested, enter Pharmacy information here including location and phone number: [facility] Ordering Provider:DARRELL CORBINPreaisha Method of Contact: Phone CallPatient's Preferred Phone Number on File: [phone] Best Call Back Number, if different:Additional Information: PATIENT SAYS SHE NEEDS ONE REFILL OF FUROSEMIDE 20 MG SENT TO [facility] BECAUSE SHE IS COMPLETELY OUT.

## 2025-03-17 NOTE — TELEPHONE ENCOUNTER
----- Message from Tech Laturina sent at 3/17/2025  2:18 PM CDT -----  Who Called: [facility]  [facility] Caller is requesting assistance/information from provider's office.Preferred Method of Contact: Phone CallPatient's Preferred Phone Number on File: [phone] Best Call Back Number, if different:Additional Information:The pharmacist called about the prescription FUROSEMIDE, needed to confirm if it was being added to the CHLORTHALIDONE, can be reached at [phone].    Left voicemail that both medications have been on her records for a while.  Chlorthalidone qd and lasix qd prn.

## 2025-03-21 DIAGNOSIS — I48.92 ATRIAL FLUTTER, UNSPECIFIED TYPE: Primary | Chronic | ICD-10-CM

## 2025-03-21 RX ORDER — DILTIAZEM HYDROCHLORIDE 240 MG/1
240 CAPSULE, COATED, EXTENDED RELEASE ORAL DAILY
Qty: 90 CAPSULE | Refills: 3 | Status: SHIPPED | OUTPATIENT
Start: 2025-03-21 | End: 2026-03-21

## 2025-03-21 NOTE — TELEPHONE ENCOUNTER
----- Message from Alicja sent at 3/21/2025 11:23 AM CDT -----  Regarding: Refill  Who Called: Hallie PenaRefill or New Rx:RefillRX Name and Strength: diltiaZEM (CARDIZEM CD) 240 MG 24 hr capsule 90 capsule 3 4/18/2024 4/18/2025 NoSig - Route: Take 1 capsule (240 mg total) by mouth once daily. - OralSent to pharmacy as: diltiaZEM (CARDIZEM CD) 240 MG 24 hr capsuleHow is the patient currently taking it? (ex. 1XDay): 1xdayIs this a 30 day or 90 day RX: [address] [facility] [place], [place] - [address], [address], [address] [place] [address]Tkood: [phone] Fax: [phone] Provider: Angela Method of Contact: Phone CallPatient's Preferred Phone Number on File: [phone] Best Call Back Number, if different:Additional Information:

## 2025-03-25 ENCOUNTER — TELEPHONE (OUTPATIENT)
Dept: DIABETES SERVICES | Facility: CLINIC | Age: 75
End: 2025-03-25
Payer: MEDICARE

## 2025-03-25 NOTE — TELEPHONE ENCOUNTER
I attempted to call patient at 1102 regarding her requested refill. She did not answer, I left a voicemail asking her to call me back.       ----- Message from Alicja sent at 3/25/2025 10:55 AM CDT -----  Regarding: Refill; Perla Garcia's patient  Who Called: Hallie JeanRefill or New Rx:RefillRX Name and Strength:  Disp Refills Start End DAWinsulin glargine 100 units/mL SubQ pen 45 mL 3 3/26/2024 - NoSig - Route: Inject 34 Units into the skin once daily. - SubcutaneousSent to pharmacy as: insulin glargine 100 units/mL SubQ penClass: NormalHow is the patient currently taking it? (ex. 1XDay): 1xdayIs this a 30 day or 90 day RX: 30 [facility]Cu[facility] [email or web address] - [place], [place] - [address], [address], [address] [place] [address]Qxlxc: [phone] Fax: [phone] Provider: Perla Garcia's patientPreferred Method of Contact: Phone CallPatient's Preferred Phone Number on File: [phone] Best Call Back Number, if different:Additional Information:

## 2025-04-09 ENCOUNTER — OFFICE VISIT (OUTPATIENT)
Dept: DIABETES SERVICES | Facility: CLINIC | Age: 75
End: 2025-04-09
Payer: MEDICARE

## 2025-04-09 VITALS
BODY MASS INDEX: 29.45 KG/M2 | OXYGEN SATURATION: 100 % | HEIGHT: 69 IN | SYSTOLIC BLOOD PRESSURE: 162 MMHG | HEART RATE: 55 BPM | RESPIRATION RATE: 18 BRPM | WEIGHT: 198.81 LBS | DIASTOLIC BLOOD PRESSURE: 78 MMHG

## 2025-04-09 DIAGNOSIS — E11.9 TYPE 2 DIABETES MELLITUS WITHOUT COMPLICATION, WITHOUT LONG-TERM CURRENT USE OF INSULIN: Primary | ICD-10-CM

## 2025-04-09 DIAGNOSIS — E78.2 MIXED HYPERLIPIDEMIA: ICD-10-CM

## 2025-04-09 DIAGNOSIS — Z21 HIV INFECTION, UNSPECIFIED SYMPTOM STATUS: ICD-10-CM

## 2025-04-09 DIAGNOSIS — E11.42 DIABETIC POLYNEUROPATHY ASSOCIATED WITH TYPE 2 DIABETES MELLITUS: ICD-10-CM

## 2025-04-09 DIAGNOSIS — I10 PRIMARY HYPERTENSION: ICD-10-CM

## 2025-04-09 DIAGNOSIS — N18.9 CHRONIC KIDNEY DISEASE, UNSPECIFIED CKD STAGE: ICD-10-CM

## 2025-04-09 LAB
GLUCOSE SERPL-MCNC: 197 MG/DL (ref 70–110)
HBA1C MFR BLD: 7.2 % (ref 4.5–6.6)

## 2025-04-09 PROCEDURE — 83036 HEMOGLOBIN GLYCOSYLATED A1C: CPT | Mod: PBBFAC | Performed by: NURSE PRACTITIONER

## 2025-04-09 PROCEDURE — 99999PBSHW POCT HEMOGLOBIN A1C: Mod: PBBFAC,,,

## 2025-04-09 PROCEDURE — 99999 PR PBB SHADOW E&M-EST. PATIENT-LVL V: CPT | Mod: PBBFAC,,, | Performed by: NURSE PRACTITIONER

## 2025-04-09 PROCEDURE — 99215 OFFICE O/P EST HI 40 MIN: CPT | Mod: PBBFAC | Performed by: NURSE PRACTITIONER

## 2025-04-09 PROCEDURE — 99999PBSHW POCT GLUCOSE, HAND-HELD DEVICE: Mod: PBBFAC,,,

## 2025-04-09 PROCEDURE — 82962 GLUCOSE BLOOD TEST: CPT | Mod: PBBFAC | Performed by: NURSE PRACTITIONER

## 2025-04-09 RX ORDER — GLIPIZIDE 10 MG/1
10 TABLET, FILM COATED, EXTENDED RELEASE ORAL
Qty: 90 TABLET | Refills: 3 | Status: SHIPPED | OUTPATIENT
Start: 2025-04-09 | End: 2026-04-09

## 2025-04-09 RX ORDER — PEN NEEDLE, DIABETIC 30 GX3/16"
NEEDLE, DISPOSABLE MISCELLANEOUS
Qty: 100 EACH | Refills: 3 | Status: SHIPPED | OUTPATIENT
Start: 2025-04-09

## 2025-04-09 RX ORDER — GLIPIZIDE 10 MG/1
10 TABLET, FILM COATED, EXTENDED RELEASE ORAL
Qty: 90 TABLET | Refills: 3 | Status: SHIPPED | OUTPATIENT
Start: 2025-04-09 | End: 2025-04-09 | Stop reason: SDUPTHER

## 2025-04-09 RX ORDER — INSULIN GLARGINE 100 [IU]/ML
36 INJECTION, SOLUTION SUBCUTANEOUS DAILY
Qty: 45 ML | Refills: 3 | Status: SHIPPED | OUTPATIENT
Start: 2025-04-09

## 2025-04-09 NOTE — PATIENT INSTRUCTIONS
-- Medications adjusted for today's visit include:    Continue your glipizide     Continue jardiance     Continue your lantus 34 units     -- Limit carbs to no more than 30-45 grams with each meal. Never eat carbs by themselves, always add protein. Make snacks low carb or non-carb only.    -- Continue checking blood sugar 3 times daily: Fasting blood sugar and vary your next 2 readings: Before lunch, before supper, 2 hours after any meal, or bedtime.   -Blood sugar goals should be a fasting blood sugar between , and no blood sugars throughout the day over 180 is good, less than 160 better, less than 140 perfect.    --Carry glucose tablets/soft peppermints/regular juice or Coke product with you at all times to treat a low blood sugar episode (less than 70). If your blood sugar is between 50-70, Chew 4 tablets or drink 1/2 cup of juice or regular Coke product. If your blood sugar is below 50, double the treatment. Re-check blood sugar in 15 minutes. If still low, repeat this. Always call the clinic to give an update for any low blood sugar episodes.    --Exercise as tolerated: Goal 30 minutes/day, 5 days/week. Start slowly and increase as tolerated.    --Follow-up for your next visit in 3 months     --Please either drop off, fax, or MyChart your readings to me as needed.

## 2025-04-09 NOTE — PROGRESS NOTES
Subjective:         Patient ID: Hallie Pena is a 75 y.o. female.  Patient's current PCP is No, Primary Doctor.     Chief Complaint: Diabetes Mellitus (Patient is here to establish care with provider. Glucose and A1c check. Patient has been a diabetic since 1996. She is checking glucose 3-4 times daily. No patient complaints. )    MICHAEL Pena is a 75 y.o. Other female presenting for a new consult with me, previously seen by CATHERINE Garcia  for diabetes. Patient has been diagnosed with type 2 diabetes for several years.  Received diabetes education: yes     CURRENT DM MEDICATIONS:   Diabetes Medications              glipiZIDE (GLUCOTROL) 10 MG TR24 Take 1 tablet (10 mg total) by mouth daily with breakfast.    insulin glargine 100 units/mL SubQ pen Inject 34 Units into the skin once daily.              Past failed treatment include:     Blood glucose testing is performed regularly. Patient is testing 3 times per day.  Meter:   Preferred lab:    Any episodes of hypoglycemia? no     Complications related to diabetes: nephropathy and cardiovascular disease    Her blood sugar in the clinic today was:   Lab Results   Component Value Date    POCGLU 197 (A) 04/09/2025       Hallie Pena presents today for follow up visit to discuss diabetes management.   Her A1C has trended down from previous levels. She remains slightly elevated at time.   Adjustments made to her insulin regimen.   She follows with nephrology at [facility] and HIV clnic thereas well.     Current diet: attempts lower carb and sugar food choices   Activity Level: sedentary     Lab Results   Component Value Date    HGBA1C 7.2 (A) 04/09/2025    HGBA1C 8.2 (A) 03/26/2024    HGBA1C 7.0 (H) 12/06/2023       STANDARDS OF CARE  Diabetes Management Status    Statin: Not taking  ACE/ARB: Taking    Screening or Prevention Patient's value Goal Complete/Controlled?   HgA1C Testing and Control   Lab Results   Component Value Date    HGBA1C 7.2 (A) 04/09/2025       "Annually/Less than 8% No   Lipid profile : 04/10/2025 Annually No   LDL control Lab Results   Component Value Date    LDLCALC 136 04/10/2025    Annually/Less than 100 mg/dl  No   Nephropathy screening Lab Results   Component Value Date    LABMICR 29.5 04/10/2025     No results found for: "PROTEINUA"  No results found for: "UTPCR"   Annually No   Blood pressure BP Readings from Last 1 Encounters:   04/09/25 (!) 162/78    Less than 140/90 No   Dilated retinal exam : 04/03/2023 Annually No   Foot exam   : 03/26/2024 Annually No          Labs reviewed and are noted below.    Lab Results   Component Value Date    CHOL 212 (H) 04/10/2025    TRIG 219 (H) 04/10/2025    HDL 32 (L) 04/10/2025    LDLCALC 136 04/10/2025    ALT 23 04/12/2023    AST 19 04/12/2023     01/10/2024    K 4.3 01/10/2024     01/10/2024    ANIONGAP 9 01/10/2024    CREATININE 2.36 (H) 01/10/2024    ESTGFRAFRICA 34 (L) 10/28/2021    EGFRNONAA 19 (L) 06/29/2022    BUN 39 (H) 01/10/2024    CO2 28 01/10/2024     (H) 01/10/2024    MICROALBUR 3.1 (H) 04/10/2025     Lab Results   Component Value Date    CALCIUM 9.6 01/10/2024     No results found for: "CPEPTIDE"  No results found for: "GLUTAMICACID"  Glucose   Date Value Ref Range Status   01/10/2024 205 (H) 74 - 106 mg/dL Final     Anion Gap   Date Value Ref Range Status   01/10/2024 9 7 - 16 mmol/L Final     eGFR    Date Value Ref Range Status   10/28/2021 34 (L) >=60 mL/min/1.73m² Final     eGFR   Date Value Ref Range Status   06/29/2022 19 (L) >=60 mL/min/1.73m² Final       The following portions of the patient's history were reviewed and updated as appropriate: allergies, current medications, past family history, past medical history, past social history, past surgical history, and problem list.    Review of patient's allergies indicates:   Allergen Reactions    Aspirin     Metformin     Keflex [cephalexin] Rash     Social History[1]  Past Medical History:   Diagnosis Date " "   Atrial fibrillation     Atrial flutter     CKD (chronic kidney disease)     Corns and callosities     Diabetic neuropathy     HIV (human immunodeficiency virus infection)     Hyperlipemia     Hypertension     Murmur     Pulmonary hypertension     Renal impairment     Type 2 diabetes mellitus        REVIEW OF SYSTEMS:  Eyes No history of DR.  Cardiovascular: History of HTN and HLD   GI: Denies nausea,vomiting,constipation,or diarrhea.  : Denies dysuria.  SKIN: Denies rashes and lesions.  Neuro: No neuropathy.  PSYCH: No tobacco use.  ENDO: See HPI.        Objective:      Vitals:    04/09/25 1129   BP: (!) 162/78   Pulse: (!) 55   Resp: 18     RESPIRATORY: No respiratory distress  NEUROLOGIC: Cranial nerves II-XII grossly intact.   PSYCHIATRIC: Alert & oriented x3. Normal mood and affect.  FOOT EXAMINATION: deferred- pt declined at this time  Assessment:       1. Type 2 diabetes mellitus without complication, without long-term current use of insulin    2. Diabetic polyneuropathy associated with type 2 diabetes mellitus    3. Mixed hyperlipidemia    4. HIV infection, unspecified symptom status    5. Primary hypertension    6. Chronic kidney disease, unspecified CKD stage        Plan:   Type 2 diabetes mellitus without complication, without long-term current use of insulin  -     POCT Glucose, Hand-Held Device  -     Hemoglobin A1C, POCT  -     Microalbumin/Creatinine Ratio, Urine  -     Lipid Panel; Future; Expected date: 04/09/2025  -     Discontinue: glipiZIDE (GLUCOTROL) 10 MG TR24; Take 1 tablet (10 mg total) by mouth daily with breakfast.  Dispense: 90 tablet; Refill: 3  -     pen needle, diabetic 32 gauge x 5/32" Ndle; Use to inject insulin sq daily  Dispense: 100 each; Refill: 3  -     insulin glargine U-100, Lantus, 100 unit/mL (3 mL) SubQ InPn pen; Inject 36 Units into the skin once daily.  Dispense: 45 mL; Refill: 3  -     glipiZIDE (GLUCOTROL) 10 MG TR24; Take 1 tablet (10 mg total) by mouth daily with " "breakfast.  Dispense: 90 tablet; Refill: 3    Diabetic polyneuropathy associated with type 2 diabetes mellitus    Mixed hyperlipidemia    HIV infection, unspecified symptom status    Primary hypertension    Chronic kidney disease, unspecified CKD stage    Other orders  -     empagliflozin (JARDIANCE) 10 mg tablet; Take 1 tablet (10 mg total) by mouth once daily.  Dispense: 30 tablet; Refill: 2  -     atorvastatin (LIPITOR) 40 MG tablet; Take 1 tablet (40 mg total) by mouth once daily.  Dispense: 90 tablet; Refill: 3        - Follow up: 3 months      Portions of this note may have been created with voice recognition software. Occasional "wrong-word" or "sound-a-like" substitutions may have occurred due to the inherent limitations of voice recognition software. Please, read the note carefully and recognize, using context, where substitutions have occurred.         Crystal BRENNAN/DL  [facility]          [1]   Social History  Socioeconomic History    Marital status: Single   Tobacco Use    Smoking status: Former    Smokeless tobacco: Former   Substance and Sexual Activity    Alcohol use: Not Currently    Drug use: Never    Sexual activity: Not Currently     Social Drivers of Health     Financial Resource Strain: Low Risk  (2/18/2025)    Overall Financial Resource Strain (CARDIA)     Difficulty of Paying Living Expenses: Not very hard   Food Insecurity: No Food Insecurity (2/18/2025)    Hunger Vital Sign     Worried About Running Out of Food in the Last Year: Never true     Ran Out of Food in the Last Year: Never true   Transportation Needs: No Transportation Needs (2/18/2025)    PRAPARE - Transportation     Lack of Transportation (Medical): No     Lack of Transportation (Non-Medical): No   Physical Activity: Insufficiently Active (2/18/2025)    Exercise Vital Sign     Days of Exercise per Week: 2 days     Minutes of Exercise per Session: 30 min   Stress: Stress Concern Present " (2/18/2025)    [employer] [place] [employer] - Occupational Stress Questionnaire     Feeling of Stress : To some extent   Housing Stability: Low Risk  (2/18/2025)    Housing Stability Vital Sign     Unable to Pay for Housing in the Last Year: No     Number of Times Moved in the Last Year: 0     Homeless in the Last Year: No

## 2025-04-10 ENCOUNTER — TELEPHONE (OUTPATIENT)
Dept: DIABETES SERVICES | Facility: CLINIC | Age: 75
End: 2025-04-10
Payer: MEDICARE

## 2025-04-10 LAB
CREAT UR-MCNC: 105 MG/DL (ref 15–325)
MICROALBUMIN UR-MCNC: 3.1 MG/DL
MICROALBUMIN/CREAT RATIO PNL UR: 29.5 MG/G (ref 0–30)

## 2025-04-10 PROCEDURE — 82570 ASSAY OF URINE CREATININE: CPT | Mod: ,,, | Performed by: CLINICAL MEDICAL LABORATORY

## 2025-04-10 PROCEDURE — 82043 UR ALBUMIN QUANTITATIVE: CPT | Mod: ,,, | Performed by: CLINICAL MEDICAL LABORATORY

## 2025-04-10 NOTE — TELEPHONE ENCOUNTER
I returned phone call to [facility] regarding patients prescription----- Message from Halima sent at 4/10/2025  8:19 AM CDT -----  Regarding: [facility] - Prescription Questions  Pharmacy is calling to request assistance with RxPharmacy name and phone number: [facility] [phone] contact: LeparkPatient Name: Hallie JeanPrescription Name: glipiZIDE (GLUCOTROL) 10 MG TR24 What do they need to clarify?: Info on prescription  
100

## 2025-04-15 RX ORDER — ATORVASTATIN CALCIUM 40 MG/1
40 TABLET, FILM COATED ORAL DAILY
Qty: 90 TABLET | Refills: 3 | Status: SHIPPED | OUTPATIENT
Start: 2025-04-15 | End: 2026-04-15

## 2025-04-16 ENCOUNTER — RESULTS FOLLOW-UP (OUTPATIENT)
Dept: DIABETES SERVICES | Facility: CLINIC | Age: 75
End: 2025-04-16

## 2025-07-15 ENCOUNTER — OFFICE VISIT (OUTPATIENT)
Dept: DIABETES SERVICES | Facility: CLINIC | Age: 75
End: 2025-07-15
Payer: MEDICARE

## 2025-07-15 ENCOUNTER — TELEPHONE (OUTPATIENT)
Dept: DIABETES SERVICES | Facility: CLINIC | Age: 75
End: 2025-07-15
Payer: MEDICARE

## 2025-07-15 VITALS
DIASTOLIC BLOOD PRESSURE: 69 MMHG | SYSTOLIC BLOOD PRESSURE: 154 MMHG | OXYGEN SATURATION: 98 % | RESPIRATION RATE: 18 BRPM | HEIGHT: 69 IN | HEART RATE: 58 BPM | BODY MASS INDEX: 28.91 KG/M2 | WEIGHT: 195.19 LBS

## 2025-07-15 DIAGNOSIS — E11.42 DIABETIC POLYNEUROPATHY ASSOCIATED WITH TYPE 2 DIABETES MELLITUS: ICD-10-CM

## 2025-07-15 DIAGNOSIS — Z21 HIV INFECTION, UNSPECIFIED SYMPTOM STATUS: ICD-10-CM

## 2025-07-15 DIAGNOSIS — E11.9 TYPE 2 DIABETES MELLITUS WITHOUT COMPLICATION, WITHOUT LONG-TERM CURRENT USE OF INSULIN: Primary | ICD-10-CM

## 2025-07-15 DIAGNOSIS — E78.2 MIXED HYPERLIPIDEMIA: ICD-10-CM

## 2025-07-15 DIAGNOSIS — N18.9 CHRONIC KIDNEY DISEASE, UNSPECIFIED CKD STAGE: ICD-10-CM

## 2025-07-15 DIAGNOSIS — I10 PRIMARY HYPERTENSION: ICD-10-CM

## 2025-07-15 LAB
GLUCOSE SERPL-MCNC: 150 MG/DL (ref 70–110)
HBA1C MFR BLD: 7 % (ref 4.5–6.6)

## 2025-07-15 PROCEDURE — 99999PBSHW POCT HEMOGLOBIN A1C: Mod: PBBFAC,,,

## 2025-07-15 PROCEDURE — 83036 HEMOGLOBIN GLYCOSYLATED A1C: CPT | Mod: PBBFAC | Performed by: NURSE PRACTITIONER

## 2025-07-15 PROCEDURE — 99999 PR PBB SHADOW E&M-EST. PATIENT-LVL V: CPT | Mod: PBBFAC,,, | Performed by: NURSE PRACTITIONER

## 2025-07-15 PROCEDURE — 99215 OFFICE O/P EST HI 40 MIN: CPT | Mod: PBBFAC | Performed by: NURSE PRACTITIONER

## 2025-07-15 PROCEDURE — 99999PBSHW POCT GLUCOSE, HAND-HELD DEVICE: Mod: PBBFAC,,,

## 2025-07-15 PROCEDURE — 99214 OFFICE O/P EST MOD 30 MIN: CPT | Mod: S$PBB,,, | Performed by: NURSE PRACTITIONER

## 2025-07-15 PROCEDURE — 82962 GLUCOSE BLOOD TEST: CPT | Mod: PBBFAC | Performed by: NURSE PRACTITIONER

## 2025-07-15 RX ORDER — EMPAGLIFLOZIN 10 MG/1
10 TABLET, FILM COATED ORAL
COMMUNITY
Start: 2025-06-27

## 2025-07-15 RX ORDER — GLIMEPIRIDE 4 MG/1
4 TABLET ORAL
COMMUNITY
End: 2025-07-15

## 2025-07-15 RX ORDER — GLIPIZIDE 10 MG/1
10 TABLET, FILM COATED, EXTENDED RELEASE ORAL
Qty: 90 TABLET | Refills: 3 | Status: SHIPPED | OUTPATIENT
Start: 2025-07-15 | End: 2026-07-15

## 2025-07-15 RX ORDER — INSULIN GLARGINE 100 [IU]/ML
36 INJECTION, SOLUTION SUBCUTANEOUS DAILY
Qty: 32.4 ML | Refills: 1 | Status: SHIPPED | OUTPATIENT
Start: 2025-07-15 | End: 2026-01-11

## 2025-07-15 RX ORDER — HYDROCORTISONE 25 MG/ML
LOTION TOPICAL
COMMUNITY

## 2025-07-15 RX ORDER — HYDROQUINONE 40 MG/G
CREAM TOPICAL
COMMUNITY

## 2025-07-15 RX ORDER — ISOSORBIDE DINITRATE 10 MG/1
TABLET ORAL
COMMUNITY
Start: 2022-05-18

## 2025-07-15 NOTE — PATIENT INSTRUCTIONS
-- Medications adjusted for today's visit include:    Continue your current medications     -- Limit carbs to no more than 30-45 grams with each meal. Never eat carbs by themselves, always add protein. Make snacks low carb or non-carb only.    -- Continue checking blood sugar 3 times daily: Fasting blood sugar and vary your next 2 readings: Before lunch, before supper, 2 hours after any meal, or bedtime.   -Blood sugar goals should be a fasting blood sugar between , and no blood sugars throughout the day over 180 is good, less than 160 better, less than 140 perfect.    --Carry glucose tablets/soft peppermints/regular juice or Coke product with you at all times to treat a low blood sugar episode (less than 70). If your blood sugar is between 50-70, Chew 4 tablets or drink 1/2 cup of juice or regular Coke product. If your blood sugar is below 50, double the treatment. Re-check blood sugar in 15 minutes. If still low, repeat this. Always call the clinic to give an update for any low blood sugar episodes.    --Exercise as tolerated: Goal 30 minutes/day, 5 days/week. Start slowly and increase as tolerated.    --Follow-up for your next visit in 3 months     --Please either drop off, fax, or MyChart your readings to me as needed.

## 2025-07-15 NOTE — TELEPHONE ENCOUNTER
I returned phone call to pharmacy. The lady I spoke with stated that they received the new prescription that Crystal Ash sent in with dx code. She thanked me.     Copied from CRM #[id number]. Topic: Medications - Medication Question  >> Jul 15, 2025  2:25 PM Madisyn wrote:  Who Called: [facility] [address] [place] [address] [address] [place] [address]  Phone: [phone] Fax: [phone]  Hours: Not open 24 hours        Refill or New Rx:Refill  RX Name and Strength:blood sugar diagnostic (ACCU-CHEK GUIDE TEST STRIPS) Strp    Diagnosis code is needed by the pharmacy to prescribe patients prescription.        Preferred Method of Contact: Phone Call  Patient's Preferred Phone Number on File: [phone]

## 2025-07-15 NOTE — PROGRESS NOTES
Subjective:         Patient ID: Hallie Pena is a 75 y.o. female.  Patient's current PCP is No, Primary Doctor.     Chief Complaint: No chief complaint on file.    HPI  Hallie Pena is a 75 y.o. Other female presenting for a follow up for diabetes. Patient has been diagnosed with type 2 diabetes for several years.  Received diabetes education: previously     CURRENT DM MEDICATIONS:   Diabetes Medications              glipiZIDE (GLUCOTROL) 10 MG TR24 Take 1 tablet (10 mg total) by mouth daily with breakfast.    insulin glargine U-100, Lantus, 100 unit/mL (3 mL) SubQ InPn pen Inject 36 Units into the skin once daily.        Taking 34 units of lantus     Just started taking jardiance on the 6/26       Past failed treatment include: n/a     Blood glucose testing is performed regularly. Patient is testing 1 times per day.  Meter:   Preferred lab:    Any episodes of hypoglycemia? no     Complications related to diabetes: nephropathy, peripheral neuropathy, cardiovascular disease, and peripheral vascular disease    Her blood sugar in the clinic today was:   Lab Results   Component Value Date    POCGLU 150 (A) 07/15/2025       Hallie Pena presents today for follow up visit to discuss diabetes management.  She denies any complaints at present. She just started taking jardiance about two weeks ago.     Her A1C has improved. She denies any low glucose readings and states her fasting range is 90-110s typically.       Current diet: attempts lower carb/sugar   Activity Level: sedentary     Lab Results   Component Value Date    HGBA1C 7.0 (A) 07/15/2025    HGBA1C 7.2 (A) 04/09/2025    HGBA1C 8.2 (A) 03/26/2024       STANDARDS OF CARE  Diabetes Management Status    Statin: Taking  ACE/ARB: Taking    Screening or Prevention Patient's value Goal Complete/Controlled?   HgA1C Testing and Control   Lab Results   Component Value Date    HGBA1C 7.0 (A) 07/15/2025      Annually/Less than 8% Yes   Lipid profile : 04/10/2025  "Annually Yes   LDL control Lab Results   Component Value Date    LDLCALC 136 04/10/2025    Annually/Less than 100 mg/dl  No   Nephropathy screening Lab Results   Component Value Date    LABMICR 29.5 04/10/2025     No results found for: "PROTEINUA"  No results found for: "UTPCR"   Annually Yes   Blood pressure BP Readings from Last 1 Encounters:   07/15/25 (!) 154/69    Less than 140/90 No   Dilated retinal exam : 03/05/2025 Annually Yes   Foot exam   : 03/26/2024 Annually No          Labs reviewed and are noted below.    Lab Results   Component Value Date    CHOL 212 (H) 04/10/2025    TRIG 219 (H) 04/10/2025    HDL 32 (L) 04/10/2025    LDLCALC 136 04/10/2025    ALT 23 04/12/2023    AST 19 04/12/2023     01/10/2024    K 4.3 01/10/2024     01/10/2024    ANIONGAP 9 01/10/2024    CREATININE 2.36 (H) 01/10/2024    ESTGFRAFRICA 34 (L) 10/28/2021    EGFRNONAA 19 (L) 06/29/2022    BUN 39 (H) 01/10/2024    CO2 28 01/10/2024     (H) 01/10/2024    MICROALBUR 3.1 (H) 04/10/2025     Lab Results   Component Value Date    CALCIUM 9.6 01/10/2024     No results found for: "CPEPTIDE"  No results found for: "GLUTAMICACID"  Glucose   Date Value Ref Range Status   01/10/2024 205 (H) 74 - 106 mg/dL Final     Anion Gap   Date Value Ref Range Status   01/10/2024 9 7 - 16 mmol/L Final     eGFR    Date Value Ref Range Status   10/28/2021 34 (L) >=60 mL/min/1.73m² Final     eGFR   Date Value Ref Range Status   06/29/2022 19 (L) >=60 mL/min/1.73m² Final       The following portions of the patient's history were reviewed and updated as appropriate: allergies, current medications, past family history, past medical history, past social history, past surgical history, and problem list.    Review of patient's allergies indicates:   Allergen Reactions    Aspirin     Metformin     Saquinavir Diarrhea    Keflex [cephalexin] Rash     Social History[1]  Past Medical History:   Diagnosis Date    Atrial fibrillation     " Atrial flutter     CKD (chronic kidney disease)     Corns and callosities     Diabetic neuropathy     HIV (human immunodeficiency virus infection)     Hyperlipemia     Hypertension     Murmur     Pulmonary hypertension     Renal impairment     Type 2 diabetes mellitus        REVIEW OF SYSTEMS:  Eyes No history of DR.  Cardiovascular: History of HTN and HLD   GI: Denies nausea,vomiting,constipation,or diarrhea.  : Denies dysuria.  SKIN: Denies rashes and lesions.  Neuro: Neuropathy.  PSYCH: No tobacco use.  ENDO: See HPI.        Objective:      Vitals:    07/15/25 1155   BP: (!) 154/69   Pulse: (!) 58   Resp: 18     RESPIRATORY: No respiratory distress  NEUROLOGIC: Cranial nerves II-XII grossly intact.   PSYCHIATRIC: Alert & oriented x3. Normal mood and affect.  FOOT EXAMINATION:   Assessment:       1. Type 2 diabetes mellitus without complication, without long-term current use of insulin    2. Mixed hyperlipidemia    3. Primary hypertension    4. Diabetic polyneuropathy associated with type 2 diabetes mellitus    5. Chronic kidney disease, unspecified CKD stage    6. HIV infection, unspecified symptom status        Plan:   Type 2 diabetes mellitus without complication, without long-term current use of insulin  -     Hemoglobin A1C, POCT  -     POCT Glucose, Hand-Held Device  -     glipiZIDE (GLUCOTROL) 10 MG TR24; Take 1 tablet (10 mg total) by mouth daily with breakfast.  Dispense: 90 tablet; Refill: 3  -     insulin glargine U-100, Lantus, 100 unit/mL (3 mL) SubQ InPn pen; Inject 36 Units into the skin once daily.  Dispense: 32.4 mL; Refill: 1  -     Discontinue: blood sugar diagnostic (ACCU-CHEK GUIDE TEST STRIPS) Strp; USE TO CHECK GLUCOSE THREE TIMES A DAY  Dispense: 300 each; Refill: 3  -     blood sugar diagnostic (ACCU-CHEK GUIDE TEST STRIPS) Strp; USE TO CHECK GLUCOSE THREE TIMES A DAY  Dispense: 300 each; Refill: 3  -     empagliflozin (JARDIANCE) 10 mg tablet; Take 1 tablet (10 mg total) by mouth once  "daily.  Dispense: 30 tablet; Refill: 5  -     Discontinue: blood sugar diagnostic (ACCU-CHEK GUIDE TEST STRIPS) Strp; USE TO CHECK GLUCOSE THREE TIMES A DAY  Dispense: 300 each; Refill: 3    Mixed hyperlipidemia  - noted     Primary hypertension  - noted     Diabetic polyneuropathy associated with type 2 diabetes mellitus  - noted; stable     Chronic kidney disease, unspecified CKD stage  - noted; stable  - follows with nephrology at [facility]     HIV infection, unspecified symptom status  - noted  - follows with ID at [facility]       - Follow up: 3 months      Portions of this note may have been created with voice recognition software. Occasional "wrong-word" or "sound-a-like" substitutions may have occurred due to the inherent limitations of voice recognition software. Please, read the note carefully and recognize, using context, where substitutions have occurred.         Crystal BRENNAN/DL  [facility]          [1]   Social History  Socioeconomic History    Marital status: Single   Tobacco Use    Smoking status: Former    Smokeless tobacco: Former   Substance and Sexual Activity    Alcohol use: Not Currently    Drug use: Never    Sexual activity: Not Currently     Social Drivers of Health     Financial Resource Strain: Low Risk  (2/18/2025)    Overall Financial Resource Strain (CARDIA)     Difficulty of Paying Living Expenses: Not very hard   Food Insecurity: No Food Insecurity (2/18/2025)    Hunger Vital Sign     Worried About Running Out of Food in the Last Year: Never true     Ran Out of Food in the Last Year: Never true   Transportation Needs: No Transportation Needs (2/18/2025)    PRAPARE - Transportation     Lack of Transportation (Medical): No     Lack of Transportation (Non-Medical): No   Physical Activity: Insufficiently Active (2/18/2025)    Exercise Vital Sign     Days of Exercise per Week: 2 days     Minutes of Exercise per Session: 30 min   Stress: Stress Concern Present " (2/18/2025)    [employer] - Occupational Stress Questionnaire     Feeling of Stress : To some extent   Housing Stability: Low Risk  (2/18/2025)    Housing Stability Vital Sign     Unable to Pay for Housing in the Last Year: No     Number of Times Moved in the Last Year: 0     Homeless in the Last Year: No